# Patient Record
Sex: FEMALE | Race: WHITE | NOT HISPANIC OR LATINO | Employment: OTHER | URBAN - METROPOLITAN AREA
[De-identification: names, ages, dates, MRNs, and addresses within clinical notes are randomized per-mention and may not be internally consistent; named-entity substitution may affect disease eponyms.]

---

## 2023-03-25 ENCOUNTER — APPOINTMENT (OUTPATIENT)
Dept: MRI IMAGING | Facility: CLINIC | Age: 88
End: 2023-03-25
Attending: HOSPITALIST
Payer: MEDICARE

## 2023-03-25 ENCOUNTER — HOSPITAL ENCOUNTER (OUTPATIENT)
Facility: CLINIC | Age: 88
Setting detail: OBSERVATION
Discharge: HOME | End: 2023-03-29
Attending: EMERGENCY MEDICINE | Admitting: HOSPITALIST
Payer: MEDICARE

## 2023-03-25 ENCOUNTER — APPOINTMENT (OUTPATIENT)
Dept: CT IMAGING | Facility: CLINIC | Age: 88
End: 2023-03-25
Attending: EMERGENCY MEDICINE
Payer: MEDICARE

## 2023-03-25 DIAGNOSIS — K80.50 BILIARY COLIC: ICD-10-CM

## 2023-03-25 DIAGNOSIS — I27.20 PULMONARY HYPERTENSION (H): ICD-10-CM

## 2023-03-25 DIAGNOSIS — K44.9 HIATAL HERNIA: ICD-10-CM

## 2023-03-25 DIAGNOSIS — S22.070A COMPRESSION FRACTURE OF T9 VERTEBRA, INITIAL ENCOUNTER (H): Primary | ICD-10-CM

## 2023-03-25 DIAGNOSIS — R93.89 ABNORMAL CT OF THE CHEST: ICD-10-CM

## 2023-03-25 DIAGNOSIS — K80.20 GALLSTONES: ICD-10-CM

## 2023-03-25 DIAGNOSIS — R10.9 ABDOMINAL PAIN, UNSPECIFIED ABDOMINAL LOCATION: ICD-10-CM

## 2023-03-25 LAB
ALBUMIN SERPL BCG-MCNC: 4.4 G/DL (ref 3.5–5.2)
ALBUMIN UR-MCNC: NEGATIVE MG/DL
ALP SERPL-CCNC: 76 U/L (ref 35–104)
ALT SERPL W P-5'-P-CCNC: 23 U/L (ref 10–35)
ANION GAP SERPL CALCULATED.3IONS-SCNC: 12 MMOL/L (ref 7–15)
APPEARANCE UR: CLEAR
AST SERPL W P-5'-P-CCNC: 32 U/L (ref 10–35)
BASOPHILS # BLD AUTO: 0 10E3/UL (ref 0–0.2)
BASOPHILS NFR BLD AUTO: 1 %
BILIRUB SERPL-MCNC: 0.7 MG/DL
BILIRUB UR QL STRIP: NEGATIVE
BUN SERPL-MCNC: 8.7 MG/DL (ref 8–23)
CALCIUM SERPL-MCNC: 9.6 MG/DL (ref 8.2–9.6)
CHLORIDE SERPL-SCNC: 99 MMOL/L (ref 98–107)
COLOR UR AUTO: NORMAL
CREAT SERPL-MCNC: 0.57 MG/DL (ref 0.51–0.95)
D DIMER PPP FEU-MCNC: 0.68 UG/ML FEU (ref 0–0.5)
DEPRECATED HCO3 PLAS-SCNC: 28 MMOL/L (ref 22–29)
EOSINOPHIL # BLD AUTO: 0 10E3/UL (ref 0–0.7)
EOSINOPHIL NFR BLD AUTO: 0 %
ERYTHROCYTE [DISTWIDTH] IN BLOOD BY AUTOMATED COUNT: 13 % (ref 10–15)
GFR SERPL CREATININE-BSD FRML MDRD: 85 ML/MIN/1.73M2
GLUCOSE SERPL-MCNC: 97 MG/DL (ref 70–99)
GLUCOSE UR STRIP-MCNC: NEGATIVE MG/DL
HCT VFR BLD AUTO: 38.8 % (ref 35–47)
HGB BLD-MCNC: 12.8 G/DL (ref 11.7–15.7)
HGB UR QL STRIP: NEGATIVE
IMM GRANULOCYTES # BLD: 0 10E3/UL
IMM GRANULOCYTES NFR BLD: 1 %
KETONES UR STRIP-MCNC: NEGATIVE MG/DL
LEUKOCYTE ESTERASE UR QL STRIP: NEGATIVE
LIPASE SERPL-CCNC: 60 U/L (ref 13–60)
LYMPHOCYTES # BLD AUTO: 1.6 10E3/UL (ref 0.8–5.3)
LYMPHOCYTES NFR BLD AUTO: 26 %
MCH RBC QN AUTO: 33.1 PG (ref 26.5–33)
MCHC RBC AUTO-ENTMCNC: 33 G/DL (ref 31.5–36.5)
MCV RBC AUTO: 100 FL (ref 78–100)
MONOCYTES # BLD AUTO: 1.5 10E3/UL (ref 0–1.3)
MONOCYTES NFR BLD AUTO: 24 %
NEUTROPHILS # BLD AUTO: 3 10E3/UL (ref 1.6–8.3)
NEUTROPHILS NFR BLD AUTO: 48 %
NITRATE UR QL: NEGATIVE
NRBC # BLD AUTO: 0 10E3/UL
NRBC BLD AUTO-RTO: 0 /100
PH UR STRIP: 7 [PH] (ref 5–7)
PLAT MORPH BLD: NORMAL
PLATELET # BLD AUTO: 174 10E3/UL (ref 150–450)
POTASSIUM SERPL-SCNC: 3.9 MMOL/L (ref 3.4–5.3)
PROT SERPL-MCNC: 6.7 G/DL (ref 6.4–8.3)
RBC # BLD AUTO: 3.87 10E6/UL (ref 3.8–5.2)
RBC MORPH BLD: NORMAL
SODIUM SERPL-SCNC: 139 MMOL/L (ref 136–145)
SP GR UR STRIP: 1.01 (ref 1–1.03)
TROPONIN T SERPL HS-MCNC: 21 NG/L
UROBILINOGEN UR STRIP-MCNC: NORMAL MG/DL
WBC # BLD AUTO: 6.2 10E3/UL (ref 4–11)

## 2023-03-25 PROCEDURE — 250N000011 HC RX IP 250 OP 636: Performed by: EMERGENCY MEDICINE

## 2023-03-25 PROCEDURE — 96375 TX/PRO/DX INJ NEW DRUG ADDON: CPT

## 2023-03-25 PROCEDURE — 85025 COMPLETE CBC W/AUTO DIFF WBC: CPT | Performed by: EMERGENCY MEDICINE

## 2023-03-25 PROCEDURE — 74177 CT ABD & PELVIS W/CONTRAST: CPT | Mod: MG

## 2023-03-25 PROCEDURE — A9585 GADOBUTROL INJECTION: HCPCS | Performed by: EMERGENCY MEDICINE

## 2023-03-25 PROCEDURE — 250N000011 HC RX IP 250 OP 636: Performed by: HOSPITALIST

## 2023-03-25 PROCEDURE — G1010 CDSM STANSON: HCPCS

## 2023-03-25 PROCEDURE — C9113 INJ PANTOPRAZOLE SODIUM, VIA: HCPCS | Performed by: HOSPITALIST

## 2023-03-25 PROCEDURE — 83690 ASSAY OF LIPASE: CPT | Performed by: EMERGENCY MEDICINE

## 2023-03-25 PROCEDURE — 96374 THER/PROPH/DIAG INJ IV PUSH: CPT | Mod: XU

## 2023-03-25 PROCEDURE — 93005 ELECTROCARDIOGRAM TRACING: CPT

## 2023-03-25 PROCEDURE — 255N000002 HC RX 255 OP 636: Performed by: EMERGENCY MEDICINE

## 2023-03-25 PROCEDURE — G0378 HOSPITAL OBSERVATION PER HR: HCPCS

## 2023-03-25 PROCEDURE — 80053 COMPREHEN METABOLIC PANEL: CPT | Performed by: EMERGENCY MEDICINE

## 2023-03-25 PROCEDURE — 84484 ASSAY OF TROPONIN QUANT: CPT | Performed by: EMERGENCY MEDICINE

## 2023-03-25 PROCEDURE — 85379 FIBRIN DEGRADATION QUANT: CPT | Performed by: EMERGENCY MEDICINE

## 2023-03-25 PROCEDURE — 99285 EMERGENCY DEPT VISIT HI MDM: CPT | Mod: 25

## 2023-03-25 PROCEDURE — 72158 MRI LUMBAR SPINE W/O & W/DYE: CPT | Mod: MF

## 2023-03-25 PROCEDURE — 250N000013 HC RX MED GY IP 250 OP 250 PS 637: Performed by: HOSPITALIST

## 2023-03-25 PROCEDURE — 250N000009 HC RX 250: Performed by: EMERGENCY MEDICINE

## 2023-03-25 PROCEDURE — 36415 COLL VENOUS BLD VENIPUNCTURE: CPT | Performed by: EMERGENCY MEDICINE

## 2023-03-25 PROCEDURE — 81003 URINALYSIS AUTO W/O SCOPE: CPT | Performed by: EMERGENCY MEDICINE

## 2023-03-25 PROCEDURE — 99223 1ST HOSP IP/OBS HIGH 75: CPT | Performed by: HOSPITALIST

## 2023-03-25 PROCEDURE — 93005 ELECTROCARDIOGRAM TRACING: CPT | Mod: 76

## 2023-03-25 RX ORDER — POTASSIUM CHLORIDE 750 MG/1
10 CAPSULE, EXTENDED RELEASE ORAL 2 TIMES DAILY
COMMUNITY

## 2023-03-25 RX ORDER — LISINOPRIL 40 MG/1
40 TABLET ORAL DAILY
COMMUNITY
Start: 2022-11-23

## 2023-03-25 RX ORDER — ASPIRIN 81 MG
100 TABLET, DELAYED RELEASE (ENTERIC COATED) ORAL DAILY
Qty: 10 TABLET | Refills: 0 | Status: SHIPPED | OUTPATIENT
Start: 2023-03-25

## 2023-03-25 RX ORDER — LISINOPRIL 40 MG/1
40 TABLET ORAL DAILY
Status: DISCONTINUED | OUTPATIENT
Start: 2023-03-26 | End: 2023-03-29 | Stop reason: HOSPADM

## 2023-03-25 RX ORDER — ONDANSETRON 4 MG/1
4 TABLET, ORALLY DISINTEGRATING ORAL EVERY 6 HOURS PRN
Status: DISCONTINUED | OUTPATIENT
Start: 2023-03-25 | End: 2023-03-29 | Stop reason: HOSPADM

## 2023-03-25 RX ORDER — CITALOPRAM HYDROBROMIDE 20 MG/1
20 TABLET ORAL DAILY
COMMUNITY
Start: 2022-11-23

## 2023-03-25 RX ORDER — VITAMIN B COMPLEX
1000 TABLET ORAL
COMMUNITY
End: 2023-03-25

## 2023-03-25 RX ORDER — FAMOTIDINE 20 MG
25 TABLET ORAL
COMMUNITY

## 2023-03-25 RX ORDER — CITALOPRAM HYDROBROMIDE 20 MG/1
20 TABLET ORAL DAILY
Status: DISCONTINUED | OUTPATIENT
Start: 2023-03-26 | End: 2023-03-29 | Stop reason: HOSPADM

## 2023-03-25 RX ORDER — VIT A/VIT C/VIT E/ZINC/COPPER 2148-113
1 TABLET ORAL 2 TIMES DAILY
COMMUNITY

## 2023-03-25 RX ORDER — ACETAMINOPHEN 325 MG/1
650 TABLET ORAL EVERY 6 HOURS PRN
Status: DISCONTINUED | OUTPATIENT
Start: 2023-03-25 | End: 2023-03-26

## 2023-03-25 RX ORDER — ACETAMINOPHEN 500 MG
1000 TABLET ORAL AT BEDTIME
COMMUNITY

## 2023-03-25 RX ORDER — GABAPENTIN 100 MG/1
100 CAPSULE ORAL 2 TIMES DAILY
Status: DISCONTINUED | OUTPATIENT
Start: 2023-03-25 | End: 2023-03-29 | Stop reason: HOSPADM

## 2023-03-25 RX ORDER — GADOBUTROL 604.72 MG/ML
5 INJECTION INTRAVENOUS ONCE
Status: COMPLETED | OUTPATIENT
Start: 2023-03-25 | End: 2023-03-25

## 2023-03-25 RX ORDER — ONDANSETRON 4 MG/1
4 TABLET, ORALLY DISINTEGRATING ORAL EVERY 8 HOURS PRN
Qty: 10 TABLET | Refills: 0 | Status: SHIPPED | OUTPATIENT
Start: 2023-03-25 | End: 2023-03-28

## 2023-03-25 RX ORDER — MULTIVITAMIN WITH FOLIC ACID 400 MCG
1 TABLET ORAL EVERY EVENING
COMMUNITY

## 2023-03-25 RX ORDER — ONDANSETRON 2 MG/ML
4 INJECTION INTRAMUSCULAR; INTRAVENOUS EVERY 6 HOURS PRN
Status: DISCONTINUED | OUTPATIENT
Start: 2023-03-25 | End: 2023-03-29 | Stop reason: HOSPADM

## 2023-03-25 RX ORDER — FERROUS SULFATE 325(65) MG
325 TABLET ORAL EVERY EVENING
COMMUNITY

## 2023-03-25 RX ORDER — FENTANYL CITRATE 50 UG/ML
25 INJECTION, SOLUTION INTRAMUSCULAR; INTRAVENOUS ONCE
Status: COMPLETED | OUTPATIENT
Start: 2023-03-25 | End: 2023-03-25

## 2023-03-25 RX ORDER — HYDROCHLOROTHIAZIDE 25 MG/1
25 TABLET ORAL DAILY
COMMUNITY
Start: 2022-11-23

## 2023-03-25 RX ORDER — OXYCODONE HYDROCHLORIDE 5 MG/1
5 TABLET ORAL EVERY 6 HOURS PRN
Qty: 6 TABLET | Refills: 0 | Status: SHIPPED | OUTPATIENT
Start: 2023-03-25

## 2023-03-25 RX ORDER — ACETAMINOPHEN 500 MG
500-1000 TABLET ORAL DAILY PRN
COMMUNITY

## 2023-03-25 RX ORDER — LIDOCAINE 4 G/G
2 PATCH TOPICAL
Status: DISCONTINUED | OUTPATIENT
Start: 2023-03-25 | End: 2023-03-26

## 2023-03-25 RX ORDER — AMLODIPINE BESYLATE 10 MG/1
10 TABLET ORAL EVERY EVENING
Status: DISCONTINUED | OUTPATIENT
Start: 2023-03-25 | End: 2023-03-29 | Stop reason: HOSPADM

## 2023-03-25 RX ORDER — HYDROCHLOROTHIAZIDE 25 MG/1
25 TABLET ORAL DAILY
Status: DISCONTINUED | OUTPATIENT
Start: 2023-03-26 | End: 2023-03-29 | Stop reason: HOSPADM

## 2023-03-25 RX ORDER — AMLODIPINE BESYLATE 10 MG/1
10 TABLET ORAL EVERY EVENING
COMMUNITY
Start: 2022-11-23

## 2023-03-25 RX ORDER — IOPAMIDOL 755 MG/ML
500 INJECTION, SOLUTION INTRAVASCULAR ONCE
Status: COMPLETED | OUTPATIENT
Start: 2023-03-25 | End: 2023-03-25

## 2023-03-25 RX ORDER — CALCIUM CARBONATE 500(1250)
1 TABLET ORAL
COMMUNITY

## 2023-03-25 RX ORDER — ACETAMINOPHEN 650 MG/1
650 SUPPOSITORY RECTAL EVERY 6 HOURS PRN
Status: DISCONTINUED | OUTPATIENT
Start: 2023-03-25 | End: 2023-03-26

## 2023-03-25 RX ADMIN — FENTANYL CITRATE 25 MCG: 50 INJECTION, SOLUTION INTRAMUSCULAR; INTRAVENOUS at 15:04

## 2023-03-25 RX ADMIN — PANTOPRAZOLE SODIUM 40 MG: 40 INJECTION, POWDER, FOR SOLUTION INTRAVENOUS at 22:40

## 2023-03-25 RX ADMIN — SODIUM CHLORIDE 72 ML: 9 INJECTION, SOLUTION INTRAVENOUS at 16:15

## 2023-03-25 RX ADMIN — FAMOTIDINE 20 MG: 10 INJECTION, SOLUTION INTRAVENOUS at 15:09

## 2023-03-25 RX ADMIN — AMLODIPINE BESYLATE 10 MG: 10 TABLET ORAL at 22:39

## 2023-03-25 RX ADMIN — LIDOCAINE PATCH 4% 2 PATCH: 40 PATCH TOPICAL at 22:40

## 2023-03-25 RX ADMIN — GADOBUTROL 5 ML: 604.72 INJECTION INTRAVENOUS at 20:28

## 2023-03-25 RX ADMIN — IOPAMIDOL 51 ML: 755 INJECTION, SOLUTION INTRAVENOUS at 16:15

## 2023-03-25 RX ADMIN — GABAPENTIN 100 MG: 100 CAPSULE ORAL at 22:39

## 2023-03-25 RX ADMIN — ACETAMINOPHEN 650 MG: 325 TABLET, FILM COATED ORAL at 22:39

## 2023-03-25 ASSESSMENT — ACTIVITIES OF DAILY LIVING (ADL)
ADLS_ACUITY_SCORE: 35
ADLS_ACUITY_SCORE: 37
ADLS_ACUITY_SCORE: 35

## 2023-03-25 ASSESSMENT — ENCOUNTER SYMPTOMS
VOMITING: 0
NAUSEA: 0
SHORTNESS OF BREATH: 1
BACK PAIN: 1
NUMBNESS: 0
DIARRHEA: 0

## 2023-03-25 NOTE — PHARMACY-ADMISSION MEDICATION HISTORY
Admission medication history interview status for this patient is complete. See [facility] admission navigator for allergy information, prior to admission medications and immunization status.     Medication history interview done, indicate source(s): Patient and Family  Medication history resources (including written lists, pill bottles, clinic record): [employer]  Pharmacy: -    Changes made to PTA medication list:  Added: All    Actions taken by pharmacist (provider contacted, etc):None     Additional medication history information:None    Medication reconciliation/reorder completed by provider prior to medication history?  N   (Y/N)          Prior to Admission medications    Medication Sig Last Dose Taking? Auth Provider Long Term End Date   acetaminophen (TYLENOL) 500 MG tablet Take 1,000 mg by mouth At Bedtime 3/24/2023 at HS Yes Unknown, Entered By History     acetaminophen (TYLENOL) 500 MG tablet Take 500-1,000 mg by mouth daily as needed for mild pain 3/25/2023 at AM- 1000 mg Yes Unknown, Entered By History     amLODIPine (NORVASC) 10 MG tablet Take 10 mg by mouth every evening 3/24/2023 at PM Yes Unknown, Entered By History Yes    calcium carbonate (OS-PAYTON) 500 MG tablet Take 1 tablet by mouth daily at 2 pm 3/24/2023 at 1400 Yes Unknown, Entered By History     citalopram (CELEXA) 20 MG tablet Take 20 mg by mouth daily 3/24/2023 at AM Yes Unknown, Entered By History Yes    ferrous sulfate (FEROSUL) 325 (65 Fe) MG tablet Take 325 mg by mouth every evening 3/24/2023 at PM Yes Unknown, Entered By History     hydrochlorothiazide (HYDRODIURIL) 25 MG tablet Take 25 mg by mouth daily 3/24/2023 at AM Yes Unknown, Entered By History Yes    lisinopril (ZESTRIL) 40 MG tablet Take 40 mg by mouth daily 3/24/2023 at AM Yes Unknown, Entered By History Yes    Multiple Vitamin (TAB-A-GUILHERME) TABS Take 1 tablet by mouth every evening 3/24/2023 at PM Yes Unknown, Entered By History     Multiple Vitamins-Minerals (PRESERVISION AREDS)  TABS Take 1 tablet by mouth 2 times daily 3/24/2023 at PM Yes Unknown, Entered By History     omeprazole (PRILOSEC) 20 MG DR capsule Take 20 mg by mouth 2 times daily May take a third capsule if needed 3/25/2023 at AM Yes Unknown, Entered By History     potassium chloride ER (MICRO-K) 10 MEQ CR capsule Take 10 mEq by mouth 2 times daily 3/24/2023 at PM Yes Unknown, Entered By History     Vitamin D, Cholecalciferol, 25 MCG (1000 UT) CAPS Take 25 mcg by mouth daily at 2 pm 3/24/2023 at 1400 Yes Unknown, Entered By History

## 2023-03-25 NOTE — ED TRIAGE NOTES
"Pt denies injury, c/o pain in thoracic back last night, was able to find a comfortable position and slept well last night. This morning pt went to get out of bed and when she stood she states \"urine gushed out\". Denies pain with voiding. Pt also c/o pain bilateral chest wall, the pain wraps around her back. Pain is constant, intermittently sharp. Pt denies cough.      Triage Assessment     Row Name 03/25/23 1257       Triage Assessment (Adult)    Airway WDL WDL       Respiratory WDL    Respiratory WDL WDL       Skin Circulation/Temperature WDL    Skin Circulation/Temperature WDL WDL       Cardiac WDL    Cardiac WDL --  c/o chest pain today       Peripheral/Neurovascular WDL    Peripheral Neurovascular WDL WDL       Cognitive/Neuro/Behavioral WDL    Cognitive/Neuro/Behavioral WDL WDL              "

## 2023-03-25 NOTE — ED NOTES
Cardiac (Adult) Cardiac WDL:  (pt comes in with chest pain and upper back pain that is constant and started yesterday evening. pt denies any SOB.)

## 2023-03-25 NOTE — DISCHARGE INSTRUCTIONS
UROLOGY    If urinary incontinence returns, please contact us for urodynamics and further evaluation.  [phone]

## 2023-03-25 NOTE — ED NOTES
"[facility]  ED Nurse Handoff Report    Jackie Gauthier is a 89 year old female   ED Chief complaint: Chest Pain  . ED Diagnosis:   Final diagnoses:   Gallstones   Biliary colic   Hiatal hernia   Pulmonary hypertension (H)     Allergies:   Allergies   Allergen Reactions     Codeine Unknown and Other (See Comments)     \"Gets loopy\"  \"Feels sick and goofy\"       Morphine Dizziness       Code Status: Full Code  Activity level - Baseline/Home:  Independent. Activity Level - Current:   Assist X 2. Lift room needed: No. Bariatric: No   Needed: No   Isolation: No. Infection: Not Applicable.     Vital Signs:   Vitals:    03/25/23 1530 03/25/23 1531 03/25/23 1546 03/25/23 1600   BP: (!) 146/89  (!) 143/87 (!) 157/86   Pulse: 80 75 81 80   Resp: 21 (!) 32 12 13   Temp:       TempSrc:       SpO2: 94% 96% 95% 96%   Weight:       Height:         Cardiac Rhythm:  ,      Undetermined rhythm   Left axis deviation   Nonspecific T wave abnormality   Abnormal ECG  Pain level:    Patient confused: No. Patient Falls Risk: Yes.   Elimination Status: Has voided   Patient Report / Focused Assessment:    Cardiac (Adult) Cardiac WDL:  (pt comes in with chest pain and upper back pain that is constant and started yesterday evening. pt denies any SOB.)   Tests Performed / Abnormal Results:   CT Chest (PE) Abdomen Pelvis w Contrast   Final Result   IMPRESSION:   1.  No pulmonary embolism. Dilated central pulmonary arteries suggesting pulmonary hypertension.   2.  Small bilateral pleural effusions. Right pleural effusion appears loculated.   3.  1.7 cm masslike area of airspace changes within the right middle lobe. This has an appearance suggestive of rounded atelectasis. However recommend follow-up chest CT to ascertain resolution.   4.   Nonspecific mild mediastinal and right hilar adenopathy.   5.  Large hiatal hernia.   6.  Cholelithiasis      MR Thoracic Spine w/o & w Contrast    (Results Pending)   MR Lumbar Spine " w/o & w Contrast    (Results Pending)     Labs Ordered and Resulted from Time of ED Arrival to Time of ED Departure   TROPONIN T, HIGH SENSITIVITY - Abnormal       Result Value    Troponin T, High Sensitivity 21 (*)    D DIMER QUANTITATIVE - Abnormal    D-Dimer Quantitative 0.68 (*)    CBC WITH PLATELETS AND DIFFERENTIAL - Abnormal    WBC Count 6.2      RBC Count 3.87      Hemoglobin 12.8      Hematocrit 38.8            MCH 33.1 (*)     MCHC 33.0      RDW 13.0      Platelet Count 174      % Neutrophils 48      % Lymphocytes 26      % Monocytes 24      % Eosinophils 0      % Basophils 1      % Immature Granulocytes 1      NRBCs per 100 WBC 0      Absolute Neutrophils 3.0      Absolute Lymphocytes 1.6      Absolute Monocytes 1.5 (*)     Absolute Eosinophils 0.0      Absolute Basophils 0.0      Absolute Immature Granulocytes 0.0      Absolute NRBCs 0.0     COMPREHENSIVE METABOLIC PANEL - Normal    Sodium 139      Potassium 3.9      Chloride 99      Carbon Dioxide (CO2) 28      Anion Gap 12      Urea Nitrogen 8.7      Creatinine 0.57      Calcium 9.6      Glucose 97      Alkaline Phosphatase 76      AST 32      ALT 23      Protein Total 6.7      Albumin 4.4      Bilirubin Total 0.7      GFR Estimate 85     LIPASE - Normal    Lipase 60     URINE MACROSCOPIC WITH REFLEX TO MICRO - Normal    Color Urine Light Yellow      Appearance Urine Clear      Glucose Urine Negative      Bilirubin Urine Negative      Ketones Urine Negative      Specific Gravity Urine 1.009      Blood Urine Negative      pH Urine 7.0      Protein Albumin Urine Negative      Urobilinogen Urine Normal      Nitrite Urine Negative      Leukocyte Esterase Urine Negative     RBC AND PLATELET MORPHOLOGY    Platelet Assessment        Value: Automated Count Confirmed. Platelet morphology is normal.    RBC Morphology Confirmed RBC Indices     Treatments provided: PIV, LABS, MRI, PUREWICK, CTSCAN  Family Comments: at bedside  OBS brochure/video  discussed/provided to patient:  Yes  ED Medications:   Medications   famotidine (PEPCID) injection 20 mg (20 mg Intravenous $Given 3/25/23 1503)   fentaNYL (PF) (SUBLIMAZE) injection 25 mcg (25 mcg Intravenous $Given 3/25/23 1504)   iopamidol (ISOVUE-370) solution 500 mL (51 mLs Intravenous $Given 3/25/23 1615)   for CT scan flush use (72 mLs Intravenous $Given 3/25/23 2495)     Drips infusing:  No  For the majority of the shift, the patient's behavior Green. Interventions performed were n0ne.    Sepsis treatment initiated: No     Patient tested for COVID 19 prior to admission: NO    ED Nurse Name/Phone Number: Tiffanie Aguirre RN,   6:28 PM      RECEIVING UNIT ED HANDOFF REVIEW    Above ED Nurse Handoff Report was reviewed: Yes  Reviewed by: Jose G Oliver RN on March 25, 2023 at 8:03 PM

## 2023-03-25 NOTE — ED PROVIDER NOTES
"  History     Chief Complaint:  Chest and back pain     HPI   Jackie Gauthier is a 89 year old female with a history of stroke, hypertension, and hyperparathyroidism who presents with chest pain and back pain. Patient reports having a history of chronic back pain, but reports her pain today is different stating it going from a 8 to a 10/10. She notes its more of a bad \"achiness\" then pain. She reports the onset of her pain started 2 days ago and travels from her back to the front of her chest. She notes it to start below her shoulder and ends at her waist. Patient reports stretching and bending her back relieves her pain, but sitting down worsens and \"brings the pain back\". She reports bladder incontinence late last night and this morning. She reports having shortness of breath with her radiating pain. Denies fever, cough, current chest pain, nausea, vomiting, diarrhea, dysuria, saddle paresthesias, focal weakness. No reported trauma.     Independent Historian:   Patient     Review of External Notes: I reviewed the note 01/09/23    ROS:  Review of Systems   Respiratory: Positive for shortness of breath.    Cardiovascular: Positive for chest pain.   Gastrointestinal: Negative for diarrhea, nausea and vomiting.   Musculoskeletal: Positive for back pain.   Neurological: Negative for numbness.   All other systems reviewed and are negative.    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     Physical Exam     Patient Vitals for the past 24 hrs:   BP Temp Temp src Pulse Resp SpO2 Height Weight   03/25/23 1600 (!) 157/86 -- -- 80 13 96 % -- --   03/25/23 1530 (!) 146/89 -- -- 80 21 94 % -- --   03/25/23 1500 (!) 165/84 -- -- 82 20 98 % -- --   03/25/23 1303 (!) 173/83 99.1  F (37.3  C) Temporal 89 24 97 % 1.473 m (4' 10\") 51.3 kg (113 lb)        Physical Exam  Nursing note and vitals reviewed.  Constitutional: Well nourished. Resting comfortably.   Eyes: Conjunctiva normal.  Pupils are equal, round, and reactive to light.   ENT: Nose normal. Mucous membranes pink and moist.    Neck: Normal range of motion.  CVS: Normal rate, regular rhythm.  Normal heart sounds.    Pulmonary: Lungs clear to auscultation bilaterally. No wheezes/rales/rhonchi.  GI: Abdomen soft. Mild generalized tenderness though greatest RUQ/epigastric region. No rigidity or guarding.  Mild R. CVA tenderness.   MSK: No calf tenderness or swelling. No c/t/l bony tenderness.   There is normal motor strength:     Iliopsoas, quadriceps, hamstrings, tibialis anterior, gastrocnemius, EHL    Sensation intact L2-S1 on bilateral lower extremities    Patellar reflexes are normal  Neuro: Alert. Follows simple commands.  Skin: Skin is warm and dry. No rash noted.   Psychiatric: Normal affect.         Emergency Department Course   ECG #1  ECG taken at 1311, ECG read at 1311  Undetermined rhythm  Left axis deviation  Nonspecific T wave abnormality  Abnormal ECG   Rate 94 bpm. AZ interval 326 ms. QRS duration 92 ms. QT/QTc 417/517 ms. P-R-T axes * -30 270.     ECG #2  ECG taken at 1933, ECG read at 1933  Atrial fibrillation with premature " ventricular or aberrantly conducted complexes  Low voltage QRS  Nonspecific ST and T wave abnormality   Abnormal ECG   Rate 81 bpm. AK interval * ms. QRS duration 86 ms. QT/QTc 406/471 ms. P-R-T axes * -23 -69.     Imaging:  CT Chest (PE) Abdomen Pelvis w Contrast   Final Result   IMPRESSION:   1.  No pulmonary embolism. Dilated central pulmonary arteries suggesting pulmonary hypertension.   2.  Small bilateral pleural effusions. Right pleural effusion appears loculated.   3.  1.7 cm masslike area of airspace changes within the right middle lobe. This has an appearance suggestive of rounded atelectasis. However recommend follow-up chest CT to ascertain resolution.   4.   Nonspecific mild mediastinal and right hilar adenopathy.   5.  Large hiatal hernia.   6.  Cholelithiasis      MR Thoracic Spine w/o & w Contrast    (Results Pending)   MR Lumbar Spine w/o & w Contrast    (Results Pending)      Report per radiology    Laboratory:  Labs Ordered and Resulted from Time of ED Arrival to Time of ED Departure   TROPONIN T, HIGH SENSITIVITY - Abnormal       Result Value    Troponin T, High Sensitivity 21 (*)    D DIMER QUANTITATIVE - Abnormal    D-Dimer Quantitative 0.68 (*)    CBC WITH PLATELETS AND DIFFERENTIAL - Abnormal    WBC Count 6.2      RBC Count 3.87      Hemoglobin 12.8      Hematocrit 38.8            MCH 33.1 (*)     MCHC 33.0      RDW 13.0      Platelet Count 174      % Neutrophils 48      % Lymphocytes 26      % Monocytes 24      % Eosinophils 0      % Basophils 1      % Immature Granulocytes 1      NRBCs per 100 WBC 0      Absolute Neutrophils 3.0      Absolute Lymphocytes 1.6      Absolute Monocytes 1.5 (*)     Absolute Eosinophils 0.0      Absolute Basophils 0.0      Absolute Immature Granulocytes 0.0      Absolute NRBCs 0.0     COMPREHENSIVE METABOLIC PANEL - Normal    Sodium 139      Potassium 3.9      Chloride 99      Carbon Dioxide (CO2) 28      Anion Gap 12      Urea Nitrogen 8.7      Creatinine  0.57      Calcium 9.6      Glucose 97      Alkaline Phosphatase 76      AST 32      ALT 23      Protein Total 6.7      Albumin 4.4      Bilirubin Total 0.7      GFR Estimate 85     LIPASE - Normal    Lipase 60     URINE MACROSCOPIC WITH REFLEX TO MICRO - Normal    Color Urine Light Yellow      Appearance Urine Clear      Glucose Urine Negative      Bilirubin Urine Negative      Ketones Urine Negative      Specific Gravity Urine 1.009      Blood Urine Negative      pH Urine 7.0      Protein Albumin Urine Negative      Urobilinogen Urine Normal      Nitrite Urine Negative      Leukocyte Esterase Urine Negative     RBC AND PLATELET MORPHOLOGY    Platelet Assessment        Value: Automated Count Confirmed. Platelet morphology is normal.    RBC Morphology Confirmed RBC Indices     TROPONIN T, HIGH SENSITIVITY        Emergency Department Course & Assessments:    Interventions:  Medications   famotidine (PEPCID) injection 20 mg (20 mg Intravenous $Given 3/25/23 1509)   fentaNYL (PF) (SUBLIMAZE) injection 25 mcg (25 mcg Intravenous $Given 3/25/23 1504)   iopamidol (ISOVUE-370) solution 500 mL (51 mLs Intravenous $Given 3/25/23 1615)   for CT scan flush use (72 mLs Intravenous $Given 3/25/23 1615)      Assessments/Consultations/Discussion of Management or Tests:   ED Course as of 03/25/23 1944   Sat Mar 25, 2023   1420 I obtained history and examined the patient as noted above.     1559 I rechecked the patient and explained findings.    1750 I spoke with Randy Mckeon MD, Hospitalist who agreed to admit the patient.         Disposition:  The patient was admitted to the hospital under the care of Randy Mckeon MD.    Impression & Plan    Medical Decision Making:  Patient is a 89-year-old female presenting with a myriad of complaints predominately chest pain and back pain.  She is nontoxic appearing on arrival, in no significant distress.  She has no reproducible bony tenderness on my exam and I doubt spinal cord  pathology.  Her tenderness was mostly abdominal and she did have some right CVA tenderness as well.  EKG without STEMI.  High-sensitivity screening troponin mildly elevated though repeat stable.  I have a low clinical suspicion for ACS.  CT without evidence of PE though does suggest dilated central pulmonary arteries suggestive of pulmonary hypertension.  Small bilateral pleural effusions noted as well.  She does have nonspecific mediastinal and right hilar adenopathy.  Incidental hiatal hernia identified in addition to gallstones though no evidence of cholecystitis and no evidence of intra-abdominal catastrophe.  I have a strong suspicion that her presentation may be secondary to biliary colic.  Labs without profound anemia or significant electrolyte derangements.  LFTs stable.  Patient reported significant symptom improvement during her time in the ED.  On my exam again she had no reproducible back pain though on discussing the etiology of her reported incontinence, I did discuss attempting a postvoid residual though son expresses concerns for patient's ability to care for herself.  He is requesting admission at this point in time.  We did discuss pending hospitalist evaluation she may benefit from formal MRIs to exclude potential underlying lumbar pathology.      Diagnosis:    ICD-10-CM    1. Gallstones  K80.20       2. Biliary colic  K80.50       3. Hiatal hernia  K44.9       4. Pulmonary hypertension (H)  I27.20       5. Abdominal pain, unspecified abdominal location  R10.9       6. Abnormal CT of the chest  R93.89     adenopathy           Discharge Medications:  New Prescriptions    DOCUSATE SODIUM (COLACE) 100 MG TABLET    Take 1 tablet (100 mg) by mouth daily    ONDANSETRON (ZOFRAN ODT) 4 MG ODT TAB    Take 1 tablet (4 mg) by mouth every 8 hours as needed for nausea    OXYCODONE (ROXICODONE) 5 MG TABLET    Take 1 tablet (5 mg) by mouth every 6 hours as needed for severe pain (7-10)        Scribe  Disclosure:  I, CASI PETE, am serving as a scribe at 5:21 PM on 3/25/2023 to document services personally performed by Arabella Dorsey DO based on my observations and the provider's statements to me.     3/25/2023   Arabella Dorsey DO McDonald, Lindsey E,   03/25/23 1959

## 2023-03-25 NOTE — H&P
[facility]    History and Physical  Hospitalist       Date of Admission:  3/25/2023    Assessment & Plan   Jackie Gauthier is a 89 year old female with a past medical history of chronic low back pain with lumbar fusion decades ago, hypertension, hiatal hernia on PPI who presents with worsening back pain, epigastric abdominal pain and urinary incontinence.    #Progressive mid to low back pain with urinary incontinence: Patient has a longstanding history of chronic low back pain.  She had a lumbar fusion done decades ago.  She notes that a couple of days ago she had onset of worsening mid to low back pain.  She has been able to ambulate per usual.  She feels like the pain is similar to prior fracture she has had.  She denies any trauma.  No falls.  She has full sensation and strength of her lower extremities.  Last night, she got up to go to the bathroom from bed and had complete loss of her bladder function.  She has never had any bladder incontinence in the past.  -ED, patient afebrile and nontachycardic.  Hypertensive with systolic blood pressure in the 140-170 range.  Her CBC without leukocytosis.  BMP unremarkable.  Her high-sensitivity troponin was mildly elevated at 21.  EKG with sinus rhythm and not clear ischemic changes.  Her D-dimer was positive.  UA without indication of infection.  She underwent a CT PE abdomen pelvis that showed no PE but evidence of pulmonary hypertension with rounded atelectasis with large hiatal hernia and cholelithiasis.  -Does have full strength of her lower extremities bilaterally.  Sensation is intact.  Not the clearest picture for any sort of cauda equina syndrome given this but with sudden urinary incontinence I do believe an MRI is warranted.  -MRI of the thoracic and lumbar spine ordered.  Discussed with ER physician will be done in the ER prior to being sent to observation.  -Lidocaine patch, as needed Tylenol.  We will also trial low-dose  gabapentin.  -If MRI is negative may need urology follow up.     #Epigastric abdominal discomfort suspect secondary to large hiatal hernia with possible cholelithiasis: Patient also notes epigastric abdominal discomfort.  She notes that this is somewhat more chronic.  She tries to eat small meals and believes it is related to her hiatal hernia.  Bowel movements have been normal.  She has had poor p.o. intake.  No nausea vomiting.  She denies any fevers or chills.  No chest pain.  No shortness of breath.  -This seems to be a bit more of a chronic issue for her.  I do suspect this is largely due to her hiatal hernia noted on CT scan.  She does have cholelithiasis noted but she denies any worsening of pain with eating/drinking.  -IV PPI while here in the hospital.  Encourage small meals.  Would not eat or drink just before bed.  Keep the head of the bed elevated to 30 degrees which should also help.  -Repeat labs in the a.m.  Consider general surgery eval as an outpatient for cholelithiasis.    #Mildly elevated troponin: EKG seems to show sinus rhythm with PACs noted without clear ischemic changes.  No clear chest pain.  Very mild elevation.  I am going to repeat an EKG.  We will check another troponin.  If no significant uptrend would not work-up further.    #HTN: We will await pharmacy med rec and resume home meds once verified.    DVT Prophylaxis: Pneumatic Compression Devices  Code Status: DNR / DNI.  Discussed on admission.  Dispo: [place] to observation    Randy Bae MD    Primary Care Physician   Nya Sands    Chief Complaint   Worsening mid-low back pain    History is obtained from the patient, patient's son, patient's chart and discussed with ER physician    History of Present Illness   Jackie Gauthier is a 89 year old female with a past medical history of chronic low back pain with lumbar fusion decades ago, hypertension, hiatal hernia on PPI who presents with worsening back pain, epigastric  abdominal pain and urinary incontinence.    Patient has a longstanding history of chronic low back pain.  She had a lumbar fusion done decades ago.  She notes that a couple of days ago she had onset of worsening mid to low back pain.  She has been able to ambulate per usual.  She feels like the pain is similar to prior fracture she has had.  She denies any trauma.  No falls.  She has full sensation and strength of her lower extremities.  Last night, she got up to go to the bathroom from bed and had complete loss of her bladder function.  She has never had any bladder incontinence in the past.    Patient also notes epigastric abdominal discomfort.  She notes that this is somewhat more chronic.  She tries to eat small meals and believes it is related to her hiatal hernia.  Bowel movements have been normal.  She has had poor p.o. intake.  No nausea vomiting.  She denies any fevers or chills.  No chest pain.  No shortness of breath.    In the ED, patient afebrile and nontachycardic.  Hypertensive with systolic blood pressure in the 140-170 range.  Her CBC without leukocytosis.  BMP unremarkable.  Her high-sensitivity troponin was mildly elevated at 21.  EKG with sinus rhythm and not clear ischemic changes.  Her D-dimer was positive.  She underwent a CT PE abdomen pelvis that showed no PE but evidence of pulmonary hypertension with rounded atelectasis with large hiatal hernia and cholelithiasis.    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 Javed     CATARACT REMOVAL 2/12/15 Right Dr. Javed       Prior to Admission Medications   None     bevacizumab 25mg/mL intravitreal solution(TONY AMB MIXTURE)   Use as directed for procedure. Refrigerate. 0.1 mL     01/06/2015   Active   acetaminophen (TYLENOL) 325 mg tablet   Take 325-650 mg by mouth every 6 hours if needed. Max acetaminophen dose: 4000mg in 24 hrs.   0     Active   ALPRAZolam (XANAX) 0.25 mg tablet   Take 0.25 mg by mouth at bedtime if needed.   0     Active   amLODIPine (NORVASC) 10 mg tablet   Take 10 mg by mouth once daily.   0     Active   ASCORBATE CALCIUM (VITAMIN C ORAL)   Take by mouth.   0     Active   CALCIUM CARBONATE/VITAMIN D3 (CALCIUM + D ORAL)   Take by mouth.   0     Active   cholecalciferol (VITAMIN D3) 1,000 unit tablet   Take 1,000 Units by mouth once daily.   0     Active   citalopram (CELEXA) 20 mg tablet   Take 20 mg by mouth once daily.   0     Active   denosumab (PROLIA) 60 mg/mL injection   Inject 60 mg subcutaneous q 24 weeks.   0     Active   ferrous sulfate, 65 mg elemental, (IRON) 325 mg (65 mg iron) tablet   Take 325 mg by mouth once daily with a meal.   0     Active   hydrochlorothiazide (HCTZ) 25 mg tablet   Take 25 mg by mouth once daily.   0     Active   ketorolac 0.5 % ophthalmic (ACULAR) solution   Place 1 Drop into left eye 2 times daily. Start 3 days before surgery and once the day of surgey   0     Active   lisinopril (PRINIVIL; ZESTRIL) 40 mg tablet   Take 40 mg by mouth once daily.   0     Active   multivitamin (MVI) tablet   Take 1 tablet by  "mouth once daily.   0     Active   VIT C/VIT E/LUTEIN/MIN/OMEGA-3 (OCUVITE ORAL)   Take by mouth.   0     Active   neomycin-polymyxin-dexamethasone (MAXITROL) 3.5mg/mL-10,000 unit/mL-0.1 % ophthalmic suspension   1 Drop 4 times daily.   0     Active   omeprazole (PRILOSEC) 20 mg Delayed-Release capsule   Take 20 mg by mouth 2 times daily before meals.   0     Active   potassium chloride (POTASSIUM CHLORIDE) 10 mEq tablet   Take 10 mEq by mouth 2 times daily with meals.          Allergies   Allergies   Allergen Reactions     Codeine Unknown and Other (See Comments)     \"Gets loopy\"  \"Feels sick and goofy\"       Morphine Dizziness       Social History   Lives by herself.  Uses a cane or walker for ambulation.  Non-smoker    Family History   Reviewed-noncontributory    Review of Systems   The 10 point Review of Systems is negative other than noted in the HPI or here.     Physical Exam   Temp: 99.1  F (37.3  C) Temp src: Temporal BP: (!) 157/86 Pulse: 80   Resp: 13 SpO2: 96 %      Vital Signs with Ranges  Temp:  [99.1  F (37.3  C)] 99.1  F (37.3  C)  Pulse:  [80-89] 80  Resp:  [13-24] 13  BP: (146-173)/(83-89) 157/86  SpO2:  [94 %-98 %] 96 %  113 lbs 0 oz    Constitutional: Elderly female, no acute distress.  Answers appropriately.  HEENT: Normocephalic, MMM, no elevation of JVD noted  Respiratory: Nl WOB, Clear bilaterally, No wheezes, no crackles  Cardiovascular: Regular, systolic murmur noted  GI: Bowel sounds present.  She does have some tenderness in the epigastric region but no rebound or guarding.  Lymph/Hematologic: No bruising. No cervical LAD  Skin: No rash  Musculoskeletal: Patient does have some tenderness in the paraspinal regions of thoracic and lumbar spine.  No point tenderness along the spinous processes however.  Patient has full strength of her lower extremities bilaterally.  Sensation is intact to touch in the distal lower extremities.  Neurologic: A&Ox3, Answers appropriately. CNII-XII intact. Moves " all extremities. No tremor  Psychiatric: Calm    Data   Data reviewed today:  I personally reviewed  Recent Labs   Lab 03/25/23  1434   WBC 6.2   HGB 12.8            POTASSIUM 3.9   CHLORIDE 99   CO2 28   BUN 8.7   CR 0.57   ANIONGAP 12   PAYTON 9.6   GLC 97   ALBUMIN 4.4   PROTTOTAL 6.7   BILITOTAL 0.7   ALKPHOS 76   ALT 23   AST 32   LIPASE 60       Recent Results (from the past 24 hour(s))   CT Chest (PE) Abdomen Pelvis w Contrast    Narrative    EXAM: CT CHEST PE ABDOMEN PELVIS W CONTRAST  LOCATION: [facility]  DATE/TIME: 3/25/2023 4:23 PM    INDICATION: Chest and abdominal pain.  COMPARISON: CT abdomen and pelvis 11/02/2009  TECHNIQUE: CT chest pulmonary angiogram and routine CT abdomen pelvis with IV contrast. Arterial phase through the chest and venous phase through the abdomen and pelvis. Multiplanar reformats and MIP reconstructions were performed. Dose reduction   techniques were used.   CONTRAST: 51mL Isovue 370    FINDINGS:  ANGIOGRAM CHEST: Mildly dilated central pulmonary arteries suggesting pulmonary hypertension. Heterogeneous opacification of several subsegmental branches within the bilateral lower lobes secondary to turbulent flow. No pulmonary emboli are visualized.   Calcified atherosclerotic changes of the thoracic aorta. Per protocol there is no luminal enhancement. No thoracic aortic aneurysm.      LUNGS AND PLEURA: Small bilateral pleural effusions. The right pleural effusion appears loculated. Mild bilateral apical fibrotic changes. Mild, smooth interlobular septal thickening within the bilateral lower upper lobes. This is a nonspecific finding   but may be seen with early interstitial pulmonary edema. Approximately 1.7 x 0.8 cm masslike area of peripheral airspace opacity within the anterior aspect of the right middle lobe. This has dendritic extensions which suggested this may represent rounded   atelectasis.    MEDIASTINUM/AXILLAE: Mildly enlarged  hilar and right paratracheal lymph nodes. Large hiatal hernia protrusion of the majority of the stomach into the mediastinum. Marked cardiomegaly.    CORONARY ARTERY CALCIFICATION: Moderate.    HEPATOBILIARY: 2.6 cm lobulated benign cyst arising from the superior aspect of the left hepatic lobe. 2.3 cm gallstone.    PANCREAS: Normal.    SPLEEN: Normal.    ADRENAL GLANDS: Stable 2.9 cm right adrenal nodule representing a benign adenoma. Negative left adrenal.    KIDNEYS/BLADDER: Multiple benign-appearing bilateral renal cysts. The dominant cyst, which arising from the lateral aspect of the right kidney measures 5.5 x 5.5 cm in diameter. These have a benign appearance and no specific follow-up is necessary. No   hydronephrosis.    BOWEL: Normal caliber loops of small bowel. Duodenal diverticulum. Normal caliber colon. Diverticulosis of the descending and sigmoid portions of the colon. No CT evidence for diverticulitis.    LYMPH NODES: Normal.    VASCULATURE: Moderate calcified atherosclerotic changes. No abdominal aortic aneurysm.    PELVIC ORGANS: Coarse calcifications within a mildly prominent uterus likely within uterine fibroids.    MUSCULOSKELETAL: Chronic fractures of the posterior and posterolateral access of the right ninth and 10th ribs. Degenerative changes visualized spine with increased thoracic kyphosis. Degenerative changes bilateral hips. Partially visualized   postoperative changes of a right femoral intramedullary nail with dynamic hip screw fixation. Degenerative changes bilateral shoulders.      Impression    IMPRESSION:  1.  No pulmonary embolism. Dilated central pulmonary arteries suggesting pulmonary hypertension.  2.  Small bilateral pleural effusions. Right pleural effusion appears loculated.  3.  1.7 cm masslike area of airspace changes within the right middle lobe. This has an appearance suggestive of rounded atelectasis. However recommend follow-up chest CT to ascertain resolution.  4.    Nonspecific mild mediastinal and right hilar adenopathy.  5.  Large hiatal hernia.  6.  Cholelithiasis       Clinically Significant Risk Factors Present on Admission

## 2023-03-26 LAB
ALBUMIN SERPL BCG-MCNC: 3.9 G/DL (ref 3.5–5.2)
ALP SERPL-CCNC: 69 U/L (ref 35–104)
ALT SERPL W P-5'-P-CCNC: 18 U/L (ref 10–35)
ANION GAP SERPL CALCULATED.3IONS-SCNC: 9 MMOL/L (ref 7–15)
AST SERPL W P-5'-P-CCNC: 26 U/L (ref 10–35)
BILIRUB SERPL-MCNC: 0.7 MG/DL
BUN SERPL-MCNC: 9.1 MG/DL (ref 8–23)
CALCIUM SERPL-MCNC: 9.1 MG/DL (ref 8.2–9.6)
CHLORIDE SERPL-SCNC: 102 MMOL/L (ref 98–107)
CREAT SERPL-MCNC: 0.58 MG/DL (ref 0.51–0.95)
DEPRECATED HCO3 PLAS-SCNC: 29 MMOL/L (ref 22–29)
ERYTHROCYTE [DISTWIDTH] IN BLOOD BY AUTOMATED COUNT: 12.9 % (ref 10–15)
GFR SERPL CREATININE-BSD FRML MDRD: 84 ML/MIN/1.73M2
GLUCOSE SERPL-MCNC: 86 MG/DL (ref 70–99)
HCT VFR BLD AUTO: 37 % (ref 35–47)
HGB BLD-MCNC: 12.2 G/DL (ref 11.7–15.7)
MCH RBC QN AUTO: 33 PG (ref 26.5–33)
MCHC RBC AUTO-ENTMCNC: 33 G/DL (ref 31.5–36.5)
MCV RBC AUTO: 100 FL (ref 78–100)
PLATELET # BLD AUTO: 162 10E3/UL (ref 150–450)
POTASSIUM SERPL-SCNC: 3.4 MMOL/L (ref 3.4–5.3)
PROT SERPL-MCNC: 5.8 G/DL (ref 6.4–8.3)
RBC # BLD AUTO: 3.7 10E6/UL (ref 3.8–5.2)
SODIUM SERPL-SCNC: 140 MMOL/L (ref 136–145)
TROPONIN T SERPL HS-MCNC: 22 NG/L
WBC # BLD AUTO: 6.7 10E3/UL (ref 4–11)

## 2023-03-26 PROCEDURE — G0378 HOSPITAL OBSERVATION PER HR: HCPCS

## 2023-03-26 PROCEDURE — C9113 INJ PANTOPRAZOLE SODIUM, VIA: HCPCS | Performed by: HOSPITALIST

## 2023-03-26 PROCEDURE — 85027 COMPLETE CBC AUTOMATED: CPT | Performed by: HOSPITALIST

## 2023-03-26 PROCEDURE — 250N000011 HC RX IP 250 OP 636: Performed by: HOSPITALIST

## 2023-03-26 PROCEDURE — 36415 COLL VENOUS BLD VENIPUNCTURE: CPT | Performed by: HOSPITALIST

## 2023-03-26 PROCEDURE — 84484 ASSAY OF TROPONIN QUANT: CPT | Performed by: INTERNAL MEDICINE

## 2023-03-26 PROCEDURE — 250N000013 HC RX MED GY IP 250 OP 250 PS 637: Performed by: INTERNAL MEDICINE

## 2023-03-26 PROCEDURE — 80053 COMPREHEN METABOLIC PANEL: CPT | Performed by: HOSPITALIST

## 2023-03-26 PROCEDURE — 99233 SBSQ HOSP IP/OBS HIGH 50: CPT | Performed by: INTERNAL MEDICINE

## 2023-03-26 PROCEDURE — 250N000013 HC RX MED GY IP 250 OP 250 PS 637: Performed by: HOSPITALIST

## 2023-03-26 PROCEDURE — 96376 TX/PRO/DX INJ SAME DRUG ADON: CPT

## 2023-03-26 RX ORDER — NALOXONE HYDROCHLORIDE 0.4 MG/ML
0.4 INJECTION, SOLUTION INTRAMUSCULAR; INTRAVENOUS; SUBCUTANEOUS
Status: DISCONTINUED | OUTPATIENT
Start: 2023-03-26 | End: 2023-03-29 | Stop reason: HOSPADM

## 2023-03-26 RX ORDER — PANTOPRAZOLE SODIUM 40 MG/1
40 TABLET, DELAYED RELEASE ORAL 2 TIMES DAILY
Status: DISCONTINUED | OUTPATIENT
Start: 2023-03-26 | End: 2023-03-29 | Stop reason: HOSPADM

## 2023-03-26 RX ORDER — NALOXONE HYDROCHLORIDE 0.4 MG/ML
0.2 INJECTION, SOLUTION INTRAMUSCULAR; INTRAVENOUS; SUBCUTANEOUS
Status: DISCONTINUED | OUTPATIENT
Start: 2023-03-26 | End: 2023-03-29 | Stop reason: HOSPADM

## 2023-03-26 RX ORDER — POTASSIUM CHLORIDE 750 MG/1
10 TABLET, EXTENDED RELEASE ORAL 2 TIMES DAILY
Status: DISCONTINUED | OUTPATIENT
Start: 2023-03-26 | End: 2023-03-29 | Stop reason: HOSPADM

## 2023-03-26 RX ORDER — LIDOCAINE 4 G/G
2 PATCH TOPICAL
Status: DISCONTINUED | OUTPATIENT
Start: 2023-03-27 | End: 2023-03-29 | Stop reason: HOSPADM

## 2023-03-26 RX ORDER — ACETAMINOPHEN 500 MG
1000 TABLET ORAL EVERY 8 HOURS
Status: DISCONTINUED | OUTPATIENT
Start: 2023-03-26 | End: 2023-03-29 | Stop reason: HOSPADM

## 2023-03-26 RX ADMIN — ACETAMINOPHEN 650 MG: 325 TABLET, FILM COATED ORAL at 12:18

## 2023-03-26 RX ADMIN — PANTOPRAZOLE SODIUM 40 MG: 40 INJECTION, POWDER, FOR SOLUTION INTRAVENOUS at 08:46

## 2023-03-26 RX ADMIN — DICLOFENAC SODIUM 2 G: 10 GEL TOPICAL at 20:17

## 2023-03-26 RX ADMIN — GABAPENTIN 100 MG: 100 CAPSULE ORAL at 20:18

## 2023-03-26 RX ADMIN — GABAPENTIN 100 MG: 100 CAPSULE ORAL at 08:46

## 2023-03-26 RX ADMIN — DICLOFENAC SODIUM 2 G: 10 GEL TOPICAL at 18:22

## 2023-03-26 RX ADMIN — TRAMADOL HYDROCHLORIDE 25 MG: 50 TABLET ORAL at 16:03

## 2023-03-26 RX ADMIN — POTASSIUM CHLORIDE 10 MEQ: 750 TABLET, FILM COATED, EXTENDED RELEASE ORAL at 20:18

## 2023-03-26 RX ADMIN — AMLODIPINE BESYLATE 10 MG: 10 TABLET ORAL at 20:17

## 2023-03-26 RX ADMIN — CITALOPRAM HYDROBROMIDE 20 MG: 20 TABLET ORAL at 08:46

## 2023-03-26 RX ADMIN — HYDROCHLOROTHIAZIDE 25 MG: 25 TABLET ORAL at 08:46

## 2023-03-26 RX ADMIN — ACETAMINOPHEN 1000 MG: 500 TABLET ORAL at 20:17

## 2023-03-26 RX ADMIN — LISINOPRIL 40 MG: 40 TABLET ORAL at 08:46

## 2023-03-26 RX ADMIN — PANTOPRAZOLE SODIUM 40 MG: 40 TABLET, DELAYED RELEASE ORAL at 20:17

## 2023-03-26 ASSESSMENT — ACTIVITIES OF DAILY LIVING (ADL)
ADLS_ACUITY_SCORE: 37

## 2023-03-26 NOTE — PROGRESS NOTES
[facility]  Hospitalist Progress Note  Parisa Calles MD 03/26/2023    Reason for Stay (Diagnosis): back pain, transient loss of bladder control, ruq abdominal pain with cholelithiasis         Assessment and Plan:      Summary of Stay: Jackie Gauthier is a 89 year old female with a history of htn, large hiatal hernia on PPI, chronic episodic epigastric pain, chronic low back pain admitted on 3/25/2023 with mid thoracic back pain with radiation around to the front, very tender ruq pain of unclear etiology and duration, and 2 episodes of spontaneous urinary incontinence    On the night prior to presentation while watching tv she noticed that she was having some discomfort in her back. Would radiate up her whole back.  No sob/cp but does report intermittent pain in the solar plexus which resolves when she massages it.  She went to bed and her pain was even worse the next morning. Worsens with movement, some radiation around her belly chest area.  She denies any antecedent falls, twisting injuries.      Then she had 2 episodes where she just lost control of her bladder.  One time was in bed, she didn't even know she was peeing.  Then had another episode when walking when she just started to pee on the floor    ED evaluation:  VSS and labs unremarkable.  UA negative  CT CAP PE:  No PE, large hiatal hernia, RML rounded atelectasis which should get follow-up to ensure not something else, cholelithiasis    MRI thoracic and lumbar spine with recent T9 compression fracture    Since being hospitalized her back pain responded very well to lidoderm patches and tylenol but since the lidoderm patch has been removed her pain is back.  Also her exam is notable for exquisite ttp to ruq to the point that she winces and jumps      Problem List:   Back pain  Thoracic compression fx-recent  I suspect her back pain is related to spontaneous compression fx  Pain control as follows     apap 1 gm tid     lidoderm patch on q am  off q pm     voltaren gel prn til new patch can be placed     Tramadol 25 mg q 6 hours prn          If unsuccessful with above would trial calcitonin   PT to evaluate ambulatory status     Cholelithiasis with exquisite ruq pain  lfts wnl on admission.  Her  Pain was quite surprising to me, and she was not tender on the left side   -check RUQ AUS to r/o GB issue    Transient urinary incontinence  No e/o cauda equina on MRI  -not sure what this represents, have asked urology to comment    Incidental rounded atelectasis seen on admission CT or that's what it appears to be, recommendations are for OP f/u to ensure stability   -will reach out to rads to see when is appropriate follow-up for that     htn on hydrochlorothiazide 25 mg every day, lisinopril 40 mg every day, amlodipine every day  and KCl 10 meq bid  -all resumed and BPs under fair control     Chronic LBP  Typically just on apap at home    Depression   Resumed pta citalopram     Large hiatal hernia  CT notes that almost her entire stomach is in her chest.  She's on PPI regularly   No sob which is good, no great therapies out there, cont with PPI      Covid   S/p 4 shot pfizer series 11/2022 (biv)    DVT Prophylaxis: Pneumatic Compression Devices  Code Status: DNR / DNI  Functional Status: lives alone in a condo, uses a walker in the morning but then is pretty independent the rest of the day, uses a cane for activities outside her condo  Diet: reg texture  Xie: not needed  Access PIV    Disposition Plan   Expected discharge in 1-2 days to tbd once back pain under control, PT eval complete, GB issue defined, transient urinary incontinence evaluated.     Entered: Parisa Calles MD 03/26/2023, 2:36 PM       Securely message with [employer] (more info)  Text page via [employer] Paging/Directory       I spent 60 minutes reviewing epic including prior labs/imaging/medical history and notes related to this encounter  In addition time was spent in interveiwing the patient,  "communicating with contacts, and medical decision making      Interval History (Subjective):      Still with back pain,     On the night prior to presentation while watching tv she noticed that she was having some discomfort in her back. Would radiate up her whole back.  No sob/cp but does report intermittent pain in the solar plexus which resolves when she massages it.  She went to bed and her pain was even worse the next morning. Worsens with movement, some radiation around her belly chest area.  She denies any antecedent falls, twisting injuries.      Then she had 2 episodes where she just lost control of her bladder.  One time was in bed, she didn't even know she was peeing.  Then had another episode when walking when she just started to pee on the floor    ED evaluation:  VSS and labs unremarkable.  UA negative  CT CAP PE:  No PE, large hiatal hernia, RML rounded atelectasis which should get follow-up to ensure not something else, cholelithiasis    MRI thoracic and lumbar spine with recent T9 compression fracture    Since being hospitalized her back pain responded very well to lidoderm patches and tylenol but since the lidoderm patch has been removed her pain is back.  Also her exam is notable for exquisite ttp to ruq to the point that she winces and jumps                  Physical Exam:      Last Vital Signs:  /84 (BP Location: Right arm)   Pulse 87   Temp 98.5  F (36.9  C) (Oral)   Resp 18   Ht 1.473 m (4' 10\")   Wt 53.2 kg (117 lb 3.2 oz)   LMP  (LMP Unknown)   SpO2 97%   BMI 24.49 kg/m      I/O:  Laying in bed she appears comfortable and in nad looks < stated age head nc/at sclera lungs cta b nl effort rrr no mrg her belly exam exquisite ruq ttp reproducible multiple times, she had anticipatory wincing, her left uq and epigastric area for the most part were fine.  Skin warm and dry no cyanosis or clubbing alert and oriented affect appropriate valadez            Medications:      All current " medications were reviewed with changes reflected in problem list.         Data:      All new lab and imaging data was reviewed.   Labs:  Recent Labs   Lab 03/26/23  0525      POTASSIUM 3.4   CHLORIDE 102   CO2 29   ANIONGAP 9   GLC 86   BUN 9.1   CR 0.58   GFRESTIMATED 84   PAYTON 9.1     Recent Labs   Lab 03/26/23  0525   WBC 6.7   HGB 12.2   HCT 37.0           Recent Labs   Lab 03/26/23  0525 03/25/23  1434   GLC 86 97     Recent Labs   Lab 03/26/23  0525 03/25/23  1434   AST 26 32   ALT 18 23   ALKPHOS 69 76   BILITOTAL 0.7 0.7      Imaging:   Results for orders placed or performed during the hospital encounter of 03/25/23   CT Chest (PE) Abdomen Pelvis w Contrast    Narrative    EXAM: CT CHEST PE ABDOMEN PELVIS W CONTRAST  LOCATION: [facility]  DATE/TIME: 3/25/2023 4:23 PM    INDICATION: Chest and abdominal pain.  COMPARISON: CT abdomen and pelvis 11/02/2009  TECHNIQUE: CT chest pulmonary angiogram and routine CT abdomen pelvis with IV contrast. Arterial phase through the chest and venous phase through the abdomen and pelvis. Multiplanar reformats and MIP reconstructions were performed. Dose reduction   techniques were used.   CONTRAST: 51mL Isovue 370    FINDINGS:  ANGIOGRAM CHEST: Mildly dilated central pulmonary arteries suggesting pulmonary hypertension. Heterogeneous opacification of several subsegmental branches within the bilateral lower lobes secondary to turbulent flow. No pulmonary emboli are visualized.   Calcified atherosclerotic changes of the thoracic aorta. Per protocol there is no luminal enhancement. No thoracic aortic aneurysm.      LUNGS AND PLEURA: Small bilateral pleural effusions. The right pleural effusion appears loculated. Mild bilateral apical fibrotic changes. Mild, smooth interlobular septal thickening within the bilateral lower upper lobes. This is a nonspecific finding   but may be seen with early interstitial pulmonary edema. Approximately  1.7 x 0.8 cm masslike area of peripheral airspace opacity within the anterior aspect of the right middle lobe. This has dendritic extensions which suggested this may represent rounded   atelectasis.    MEDIASTINUM/AXILLAE: Mildly enlarged hilar and right paratracheal lymph nodes. Large hiatal hernia protrusion of the majority of the stomach into the mediastinum. Marked cardiomegaly.    CORONARY ARTERY CALCIFICATION: Moderate.    HEPATOBILIARY: 2.6 cm lobulated benign cyst arising from the superior aspect of the left hepatic lobe. 2.3 cm gallstone.    PANCREAS: Normal.    SPLEEN: Normal.    ADRENAL GLANDS: Stable 2.9 cm right adrenal nodule representing a benign adenoma. Negative left adrenal.    KIDNEYS/BLADDER: Multiple benign-appearing bilateral renal cysts. The dominant cyst, which arising from the lateral aspect of the right kidney measures 5.5 x 5.5 cm in diameter. These have a benign appearance and no specific follow-up is necessary. No   hydronephrosis.    BOWEL: Normal caliber loops of small bowel. Duodenal diverticulum. Normal caliber colon. Diverticulosis of the descending and sigmoid portions of the colon. No CT evidence for diverticulitis.    LYMPH NODES: Normal.    VASCULATURE: Moderate calcified atherosclerotic changes. No abdominal aortic aneurysm.    PELVIC ORGANS: Coarse calcifications within a mildly prominent uterus likely within uterine fibroids.    MUSCULOSKELETAL: Chronic fractures of the posterior and posterolateral access of the right ninth and 10th ribs. Degenerative changes visualized spine with increased thoracic kyphosis. Degenerative changes bilateral hips. Partially visualized   postoperative changes of a right femoral intramedullary nail with dynamic hip screw fixation. Degenerative changes bilateral shoulders.      Impression    IMPRESSION:  1.  No pulmonary embolism. Dilated central pulmonary arteries suggesting pulmonary hypertension.  2.  Small bilateral pleural effusions. Right  pleural effusion appears loculated.  3.  1.7 cm masslike area of airspace changes within the right middle lobe. This has an appearance suggestive of rounded atelectasis. However recommend follow-up chest CT to ascertain resolution.  4.   Nonspecific mild mediastinal and right hilar adenopathy.  5.  Large hiatal hernia.  6.  Cholelithiasis   MR Thoracic Spine w/o & w Contrast    Narrative    EXAM: MR THORACIC SPINE W/O and W CONTRAST, MR LUMBAR SPINE W/O and W CONTRAST  LOCATION: [facility]  DATE/TIME: 3/25/2023 9:28 PM    INDICATION: Worsening back pain with urinary incontinence  COMPARISON:  CT abdomen pelvis 11/02/2009, CT chest 03/25/2023.  CONTRAST: 5 mL Gadavist  TECHNIQUE:   1) Routine Thoracic Spine MRI without and with IV contrast.  2) Routine Lumbar Spine MRI without and with IV contrast.    FINDINGS:  THORACIC SPINE:  Thoracolumbar scoliosis. Multilevel degenerative listheses in the thoracic spine with mid/lower thoracic kyphosis. Slight endplate irregularities, height loss (less than 50%), enhancement, and prevertebral edema at T9 suggestive of a recent fracture. Few   levels of degenerative endplate edema also demonstrates sclerotic changes on recent CT. Chronic T2 and T3 superior endplate deformities with mild height loss. Remaining vertebral body heights are unremarkable. Multilevel degenerative changes without   high-grade canal stenosis. Small pleural effusions. The large hiatal hernia is better assessed on recent CT.    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 Incompletely formed disc space at L5-S1. Mildly heterogeneous marrow signal. Lumbar levocurvature. Few levels of degenerative listhesis. No suspicious marrow signal. Multilevel degenerative   changes without high-grade canal stenosis. Severe right L1-L2 and bilateral L2-L3 foraminal stenosis. Additional areas of mild foraminal stenosis. The conus terminates at L2-L3. Dorsal paraspinal  and bilateral iliacus muscle atrophy. Intra-abdominal   findings are better assessed on recent CT.       Impression    IMPRESSION:    THORACIC SPINE MRI:  1.  Recent T9 compression fracture with less than 50% vertebral body height loss. Associated enhancement and prevertebral edema. No traumatic subluxation.  2.  Multilevel degenerative changes without high-grade canal stenosis or abnormal cord signal.    LUMBAR SPINE MRI:  1.  Transitional lumbosacral anatomy with sacralization of L5.  2.  Multilevel degenerative changes without high-grade canal stenosis. Foraminal stenosis, as detailed above.   MR Lumbar Spine w/o & w Contrast    Narrative    EXAM: MR THORACIC SPINE W/O and W CONTRAST, MR LUMBAR SPINE W/O and W CONTRAST  LOCATION: [facility]  DATE/TIME: 3/25/2023 9:28 PM    INDICATION: Worsening back pain with urinary incontinence  COMPARISON:  CT abdomen pelvis 11/02/2009, CT chest 03/25/2023.  CONTRAST: 5 mL Gadavist  TECHNIQUE:   1) Routine Thoracic Spine MRI without and with IV contrast.  2) Routine Lumbar Spine MRI without and with IV contrast.    FINDINGS:  THORACIC SPINE:  Thoracolumbar scoliosis. Multilevel degenerative listheses in the thoracic spine with mid/lower thoracic kyphosis. Slight endplate irregularities, height loss (less than 50%), enhancement, and prevertebral edema at T9 suggestive of a recent fracture. Few   levels of degenerative endplate edema also demonstrates sclerotic changes on recent CT. Chronic T2 and T3 superior endplate deformities with mild height loss. Remaining vertebral body heights are unremarkable. Multilevel degenerative changes without   high-grade canal stenosis. Small pleural effusions. The large hiatal hernia is better assessed on recent CT.    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 Incompletely formed disc space at L5-S1. Mildly heterogeneous marrow signal. Lumbar levocurvature. Few levels of degenerative  listhesis. No suspicious marrow signal. Multilevel degenerative   changes without high-grade canal stenosis. Severe right L1-L2 and bilateral L2-L3 foraminal stenosis. Additional areas of mild foraminal stenosis. The conus terminates at L2-L3. Dorsal paraspinal and bilateral iliacus muscle atrophy. Intra-abdominal   findings are better assessed on recent CT.       Impression    IMPRESSION:    THORACIC SPINE MRI:  1.  Recent T9 compression fracture with less than 50% vertebral body height loss. Associated enhancement and prevertebral edema. No traumatic subluxation.  2.  Multilevel degenerative changes without high-grade canal stenosis or abnormal cord signal.    LUMBAR SPINE MRI:  1.  Transitional lumbosacral anatomy with sacralization of L5.  2.  Multilevel degenerative changes without high-grade canal stenosis. Foraminal stenosis, as detailed above.

## 2023-03-26 NOTE — PLAN OF CARE
PRIMARY DIAGNOSIS: Low back pain, Pulmonary hypertension , Hiatal hernia  OUTPATIENT/OBSERVATION GOALS TO BE MET BEFORE DISCHARGE:  1. Pain Status: Improved-controlled with oral pain medications.    2. Return to near baseline physical activity: No    3. Cleared for discharge by consultants (if involved): No    Discharge Planner Nurse   Safe discharge environment identified: Yes  Barriers to discharge: Yes       Entered by: Jose G Oliver RN 03/26/2023 1:05 AM     Please review provider order for any additional goals.   Nurse to notify provider when observation goals have been met and patient is ready for discharge.

## 2023-03-26 NOTE — PLAN OF CARE
Goal Outcome Evaluation:    PRIMARY DIAGNOSIS: ACUTE PAIN  OUTPATIENT/OBSERVATION GOALS TO BE MET BEFORE DISCHARGE:  1. Pain Status: Improved- requiring PO medication     2. Return to near baseline physical activity: Yes     3. Cleared for discharge by consultants (if involved): No     Discharge Planner Nurse   Safe discharge environment identified: Yes  Barriers to discharge: Yes       Entered by: Karolyn Painting RN 03/26/2023 12:45 pm  Please review provider order for any additional goals.   Nurse to notify provider when observation goals have been met and patient is ready for discharge.

## 2023-03-26 NOTE — PLAN OF CARE
ROOM # 208-1    Living Situation : Home alone -Independent   Facility name: N/A  : Armando ( Son) [phone]    Activity level at baseline: Independent   Activity level on admit: A X 2     Who will be transporting you at discharge: Armando ( Son)    Patient registered to observation; given Patient Bill of Rights; given the opportunity to ask questions about observation status and their plan of care.  Patient has been oriented to the observation room, bathroom and call light is in place.    Discussed discharge goals and expectations with patient/family.

## 2023-03-26 NOTE — PLAN OF CARE
"PRIMARY DIAGNOSIS: Low back pain, Pulmonary hypertension , Hiatal hernia  OUTPATIENT/OBSERVATION GOALS TO BE MET BEFORE DISCHARGE:  1. Pain Status: Improved-controlled with oral pain medications.    2. Return to near baseline physical activity: No    3. Cleared for discharge by consultants (if involved): No    Discharge Planner Nurse   Safe discharge environment identified: Yes  Barriers to discharge: Yes   A & O X 4. Pleasant. Lung sound clear bilaterally to auscultation . Denies chest pain, shortness of breath, lightheadedness, nausea, or vomit. Bowel sound present all quadrants and active.  A X 2 . Pure wick in place with adequate output. Wears hearing aid bilaterally.  On low fat diet. Peripheral IV saline lock. Pain is managed with Tylenol, lidocaine patch . Patient on IV PPI. UA negative. MRI of thoracic and Lumbar spine results pending. Afebrile.  /65 (BP Location: Right arm)   Pulse 81   Temp 97.6  F (36.4  C) (Oral)   Resp 16   Ht 1.473 m (4' 10\")   Wt 53.2 kg (117 lb 3.2 oz)   LMP  (LMP Unknown)   SpO2 90%   BMI 24.49 kg/m         Entered by: Jose G Oliver RN 03/26/2023 2:07 AM     Please review provider order for any additional goals.   Nurse to notify provider when observation goals have been met and patient is ready for discharge.         "

## 2023-03-26 NOTE — PLAN OF CARE
Goal Outcome Evaluation:    PRIMARY DIAGNOSIS: ACUTE PAIN  OUTPATIENT/OBSERVATION GOALS TO BE MET BEFORE DISCHARGE:  1. Pain Status: Improved- requiring PO medication     2. Return to near baseline physical activity: Yes     3. Cleared for discharge by consultants (if involved): No     Discharge Planner Nurse   Safe discharge environment identified: Yes  Barriers to discharge: Yes       Entered by: Karolyn Painting RN 03/26/2023 5:30 PM  Please review provider order for any additional goals.   Nurse to notify provider when observation goals have been met and patient is ready for discharge.    Pt resting comfortably. VSS. A&O. Pain managed with tylenol, tramadol, and voltaren gel. Transfers with Ax1. Continent of urine. Tolerating reg diet. Plan for Ultrasound tomorrow, NPO at 0000.

## 2023-03-26 NOTE — PLAN OF CARE
PRIMARY DIAGNOSIS: ACUTE PAIN  OUTPATIENT/OBSERVATION GOALS TO BE MET BEFORE DISCHARGE:  1. Pain Status: Pain free.    2. Return to near baseline physical activity: Yes    3. Cleared for discharge by consultants (if involved): No    Discharge Planner Nurse   Safe discharge environment identified: Yes  Barriers to discharge: Yes       Entered by: Karolyn Painting RN 03/26/2023 9:15 AM     Please review provider order for any additional goals.   Nurse to notify provider when observation goals have been met and patient is ready for discharge.

## 2023-03-27 ENCOUNTER — APPOINTMENT (OUTPATIENT)
Dept: ULTRASOUND IMAGING | Facility: CLINIC | Age: 88
End: 2023-03-27
Attending: INTERNAL MEDICINE
Payer: MEDICARE

## 2023-03-27 ENCOUNTER — APPOINTMENT (OUTPATIENT)
Dept: PHYSICAL THERAPY | Facility: CLINIC | Age: 88
End: 2023-03-27
Attending: INTERNAL MEDICINE
Payer: MEDICARE

## 2023-03-27 LAB
ALBUMIN SERPL BCG-MCNC: 3.9 G/DL (ref 3.5–5.2)
ALP SERPL-CCNC: 72 U/L (ref 35–104)
ALT SERPL W P-5'-P-CCNC: 19 U/L (ref 10–35)
ANION GAP SERPL CALCULATED.3IONS-SCNC: 11 MMOL/L (ref 7–15)
AST SERPL W P-5'-P-CCNC: 26 U/L (ref 10–35)
ATRIAL RATE - MUSE: 326 BPM
ATRIAL RATE - MUSE: 98 BPM
BILIRUB SERPL-MCNC: 0.7 MG/DL
BUN SERPL-MCNC: 12.4 MG/DL (ref 8–23)
CALCIUM SERPL-MCNC: 9.4 MG/DL (ref 8.2–9.6)
CHLORIDE SERPL-SCNC: 97 MMOL/L (ref 98–107)
CREAT SERPL-MCNC: 0.64 MG/DL (ref 0.51–0.95)
DEPRECATED HCO3 PLAS-SCNC: 28 MMOL/L (ref 22–29)
DIASTOLIC BLOOD PRESSURE - MUSE: NORMAL MMHG
DIASTOLIC BLOOD PRESSURE - MUSE: NORMAL MMHG
ERYTHROCYTE [DISTWIDTH] IN BLOOD BY AUTOMATED COUNT: 12.7 % (ref 10–15)
GFR SERPL CREATININE-BSD FRML MDRD: 82 ML/MIN/1.73M2
GLUCOSE SERPL-MCNC: 102 MG/DL (ref 70–99)
HCT VFR BLD AUTO: 38 % (ref 35–47)
HGB BLD-MCNC: 12.7 G/DL (ref 11.7–15.7)
INTERPRETATION ECG - MUSE: NORMAL
INTERPRETATION ECG - MUSE: NORMAL
MCH RBC QN AUTO: 33.2 PG (ref 26.5–33)
MCHC RBC AUTO-ENTMCNC: 33.4 G/DL (ref 31.5–36.5)
MCV RBC AUTO: 99 FL (ref 78–100)
P AXIS - MUSE: NORMAL DEGREES
P AXIS - MUSE: NORMAL DEGREES
PLATELET # BLD AUTO: 158 10E3/UL (ref 150–450)
POTASSIUM SERPL-SCNC: 3.4 MMOL/L (ref 3.4–5.3)
PR INTERVAL - MUSE: NORMAL MS
PR INTERVAL - MUSE: NORMAL MS
PROT SERPL-MCNC: 6.1 G/DL (ref 6.4–8.3)
QRS DURATION - MUSE: 86 MS
QRS DURATION - MUSE: 92 MS
QT - MUSE: 406 MS
QT - MUSE: 414 MS
QTC - MUSE: 471 MS
QTC - MUSE: 517 MS
R AXIS - MUSE: -23 DEGREES
R AXIS - MUSE: -30 DEGREES
RBC # BLD AUTO: 3.83 10E6/UL (ref 3.8–5.2)
SODIUM SERPL-SCNC: 136 MMOL/L (ref 136–145)
SYSTOLIC BLOOD PRESSURE - MUSE: NORMAL MMHG
SYSTOLIC BLOOD PRESSURE - MUSE: NORMAL MMHG
T AXIS - MUSE: -69 DEGREES
T AXIS - MUSE: 270 DEGREES
VENTRICULAR RATE- MUSE: 81 BPM
VENTRICULAR RATE- MUSE: 94 BPM
WBC # BLD AUTO: 7.6 10E3/UL (ref 4–11)

## 2023-03-27 PROCEDURE — 76705 ECHO EXAM OF ABDOMEN: CPT

## 2023-03-27 PROCEDURE — G0378 HOSPITAL OBSERVATION PER HR: HCPCS

## 2023-03-27 PROCEDURE — 99232 SBSQ HOSP IP/OBS MODERATE 35: CPT | Performed by: INTERNAL MEDICINE

## 2023-03-27 PROCEDURE — 250N000013 HC RX MED GY IP 250 OP 250 PS 637: Performed by: HOSPITALIST

## 2023-03-27 PROCEDURE — 36415 COLL VENOUS BLD VENIPUNCTURE: CPT | Performed by: INTERNAL MEDICINE

## 2023-03-27 PROCEDURE — 80053 COMPREHEN METABOLIC PANEL: CPT | Performed by: INTERNAL MEDICINE

## 2023-03-27 PROCEDURE — 85018 HEMOGLOBIN: CPT | Performed by: INTERNAL MEDICINE

## 2023-03-27 PROCEDURE — 99221 1ST HOSP IP/OBS SF/LOW 40: CPT | Mod: FS | Performed by: STUDENT IN AN ORGANIZED HEALTH CARE EDUCATION/TRAINING PROGRAM

## 2023-03-27 PROCEDURE — 97161 PT EVAL LOW COMPLEX 20 MIN: CPT | Mod: GP | Performed by: PHYSICAL THERAPIST

## 2023-03-27 PROCEDURE — 250N000013 HC RX MED GY IP 250 OP 250 PS 637: Performed by: INTERNAL MEDICINE

## 2023-03-27 RX ADMIN — GABAPENTIN 100 MG: 100 CAPSULE ORAL at 20:42

## 2023-03-27 RX ADMIN — DICLOFENAC SODIUM 2 G: 10 GEL TOPICAL at 20:41

## 2023-03-27 RX ADMIN — PANTOPRAZOLE SODIUM 40 MG: 40 TABLET, DELAYED RELEASE ORAL at 08:31

## 2023-03-27 RX ADMIN — DICLOFENAC SODIUM 2 G: 10 GEL TOPICAL at 12:05

## 2023-03-27 RX ADMIN — LISINOPRIL 40 MG: 40 TABLET ORAL at 08:31

## 2023-03-27 RX ADMIN — CITALOPRAM HYDROBROMIDE 20 MG: 20 TABLET ORAL at 08:31

## 2023-03-27 RX ADMIN — ACETAMINOPHEN 1000 MG: 500 TABLET ORAL at 05:00

## 2023-03-27 RX ADMIN — GABAPENTIN 100 MG: 100 CAPSULE ORAL at 08:31

## 2023-03-27 RX ADMIN — DICLOFENAC SODIUM 2 G: 10 GEL TOPICAL at 16:06

## 2023-03-27 RX ADMIN — POTASSIUM CHLORIDE 10 MEQ: 750 TABLET, FILM COATED, EXTENDED RELEASE ORAL at 20:42

## 2023-03-27 RX ADMIN — POTASSIUM CHLORIDE 10 MEQ: 750 TABLET, FILM COATED, EXTENDED RELEASE ORAL at 08:31

## 2023-03-27 RX ADMIN — ACETAMINOPHEN 1000 MG: 500 TABLET ORAL at 20:42

## 2023-03-27 RX ADMIN — AMLODIPINE BESYLATE 10 MG: 10 TABLET ORAL at 20:42

## 2023-03-27 RX ADMIN — HYDROCHLOROTHIAZIDE 25 MG: 25 TABLET ORAL at 08:31

## 2023-03-27 RX ADMIN — TRAMADOL HYDROCHLORIDE 25 MG: 50 TABLET ORAL at 20:41

## 2023-03-27 RX ADMIN — PANTOPRAZOLE SODIUM 40 MG: 40 TABLET, DELAYED RELEASE ORAL at 20:42

## 2023-03-27 RX ADMIN — ACETAMINOPHEN 1000 MG: 500 TABLET ORAL at 12:04

## 2023-03-27 RX ADMIN — DICLOFENAC SODIUM 2 G: 10 GEL TOPICAL at 08:34

## 2023-03-27 RX ADMIN — LIDOCAINE PATCH 4% 2 PATCH: 40 PATCH TOPICAL at 08:32

## 2023-03-27 ASSESSMENT — ACTIVITIES OF DAILY LIVING (ADL)
ADLS_ACUITY_SCORE: 37
ADLS_ACUITY_SCORE: 31
ADLS_ACUITY_SCORE: 37

## 2023-03-27 NOTE — PROGRESS NOTES
"   03/27/23 1324   Appointment Info   Signing Clinician's Name / Credentials (PT) Rupinder Alvarez DPT   Living Environment   Living Environment Comments Lives alone in condo with elevator access; no stairs to enter from outside; IND with basic ADLs, has  every other week; son provides transportation/takes grocery shopping; uses 4WW first thing in the morning and then can usually progress to no AD as the day progresses   Self-Care   Usual Activity Tolerance good   Current Activity Tolerance good   Equipment Currently Used at Home cane, straight;walker, rolling   Fall history within last six months no   General Information   Onset of Illness/Injury or Date of Surgery 03/25/23   Referring Physician Parisa Calles MD   Patient/Family Therapy Goals Statement (PT) to go home and have less rib pain   Pertinent History of Current Problem (include personal factors and/or comorbidities that impact the POC) Jackie Gauthier is a 89 year old female with a history of htn, large hiatal hernia on PPI, chronic episodic epigastric pain, chronic low back pain admitted on 3/25/2023 with mid thoracic back pain with radiation around to the front, very tender ruq pain of unclear etiology and duration, and 2 episodes of spontaneous urinary incontinence   Existing Precautions/Restrictions spinal  (rec 2/2 back pain)   General Observations Supine, NAD   Cognition   Affect/Mental Status (Cognition) WFL   Orientation Status (Cognition) oriented x 4   Follows Commands (Cognition) WFL   Pain Assessment   Patient Currently in Pain   (did not rate 0-10, states \"tolerable\" pain in ribs and improved pain in back from PTA)   Posture    Posture Forward head position;Protracted shoulders;Kyphosis   Range of Motion (ROM)   Range of Motion ROM is WFL   Strength (Manual Muscle Testing)   Strength (Manual Muscle Testing) strength is WFL   Bed Mobility   Bed Mobility no deficits identified   Comment, (Bed Mobility) aware of log roll "   Transfers   Transfers sit-stand transfer   Sit-Stand Transfer   Sit-Stand [place] (Transfers) modified independence   Assistive Device (Sit-Stand Transfers) walker, front-wheeled   Gait/Stairs (Locomotion)   [place] Level (Gait) contact guard  (progressed to supervision)   Assistive Device (Gait) walker, front-wheeled   Distance in Feet >200'   Pattern (Gait) step-through   Balance   Balance Comments no overt LOB/lateral path deviation noted   Sensory Examination   Sensory Perception patient reports no sensory changes   Clinical Impression   Criteria for Skilled Therapeutic Intervention Evaluation only   Clinical Presentation (PT Evaluation Complexity) Stable/Uncomplicated   Clinical Presentation Rationale clinical judgement   Clinical Decision Making (Complexity) low complexity   Risk & Benefits of therapy have been explained evaluation/treatment results reviewed;current/potential barriers reviewed;care plan/treatment goals reviewed;risks/benefits reviewed;participants voiced agreement with care plan;participants included;patient;son   PT Total Evaluation Time   PT Eval, Low Complexity Minutes (95118) 18   PT Discharge Planning   PT Plan no acute PT goals identified   PT Discharge Recommendation (DC Rec) home with assist   PT Rationale for DC Rec Pt moving at baseline for mobility per son's report and PT assessment.  Pt does not demo need for HHPT/OPPT at this time and has appropriate access to equipment at home upon discharge.  Rec discharge home with prior level of assist from son (transportation/groceries) and housekeeping assist.   PT Brief overview of current status supervision with 2WW d/t poor vision from macular degneration   Total Session Time   Total Session Time (sum of timed and untimed services) 18

## 2023-03-27 NOTE — PROGRESS NOTES
[facility]  Hospitalist Progress Note  Parisa Calles MD 03/27/2023    Reason for Stay (Diagnosis): back pain, transient loss of bladder control, ruq abdominal pain with cholelithiasis         Assessment and Plan:      Summary of Stay: Jackie Gauthier is a 89 year old female with a history of htn, large hiatal hernia on PPI, chronic episodic epigastric pain, chronic low back pain admitted on 3/25/2023 with mid thoracic back pain with radiation around to the front, very tender ruq pain of unclear etiology and duration, and 2 episodes of spontaneous urinary incontinence    On the night prior to presentation while watching tv she noticed that she was having some discomfort in her back. Would radiate up her whole back.  No sob/cp but does report intermittent pain in the solar plexus which resolves when she massages it.  She went to bed and her pain was even worse the next morning. Worsens with movement, some radiation around her belly chest area.  She denies any antecedent falls, twisting injuries.      Then she had 2 episodes where she just lost control of her bladder.  One time was in bed, she didn't even know she was peeing.  Then had another episode when walking when she just started to pee on the floor    ED evaluation:  VSS and labs unremarkable.  UA negative  CT CAP PE:  No PE, large hiatal hernia, RML rounded atelectasis which should get follow-up to ensure not something else, cholelithiasis    MRI thoracic and lumbar spine with recent T9 compression fracture  Her hospitalization is also notable for pretty servere ruq/lower rib pain.  Given the cholelithiasis seen on CT I ordered US which was negative for GB issue.  I reviewed her CT with radiologist who do not see any lower rib fx or other etiology for the pain.  Her skin is normal-no e/o shingles.  Temporally in coincided with the back pain but doesn't sound like nerve pain-more achy than sharp shooting/burning    She is actually moving quite well  and should be able to discharge back to her condo.  She'd like her various pains under better control but is aiming for tomorrow      Problem List:   Back pain  Thoracic compression fx-recent  I suspect her back pain is related to spontaneous compression fx  Pain control as follows     apap 1 gm tid     lidoderm patch on q am off q pm     Gabapentin 100 mg bid      Tramadol 25 mg q 6 hours prn          If unsuccessful with above would trial calcitonin   Overall back pain is under good control   PT to evaluate ambulatory status and she's doing well - I witnessed her ambulating in the [place] with a walker without any difficulties     Cholelithiasis with exquisite ruq pain  lfts wnl on admission.  Her  Pain was quite surprising to me, and she was not tender on the left side   -US negative for GB issues, skin exam is normal.  , I reviewed her CT scan with the radiologist and no findings to account for her pain.  Has chronic posterior rib fractures.  And has ? Anterior 4th rib fracture-I pushed over this rib and no pain at all so not clear what her pain is from.  She expressed that she wanted to know what this pain is from.  I told her that sometimes we just don't know.  I do suspect it's MSK in origin and that's why we can't find anything in her imaging.  She does state that heating pad helps   -trial voltaren gel     Transient urinary incontinence  No e/o cauda equina on MRI  -appreciate urology input    Incidental rounded atelectasis seen on admission CT or that's what it appears to be, recommendations are for OP f/u to ensure stability   -consider repeat CT in 4-6 weeks, I neglected to discuss this with her and her son today     htn on hydrochlorothiazide 25 mg every day, lisinopril 40 mg every day, amlodipine every day  and KCl 10 meq bid  -all resumed and BPs under fair control     Chronic LBP  Typically just on apap at home    Depression   Resumed pta citalopram     Large hiatal hernia  CT notes that almost her  "entire stomach is in her chest.  She's on PPI regularly   No sob which is good, no great therapies out there, cont with PPI      Covid   S/p 4 shot pfizer series 11/2022 (biv)    DVT Prophylaxis: Pneumatic Compression Devices  Code Status: DNR / DNI  Functional Status: lives alone in a condo, uses a walker in the morning but then is pretty independent the rest of the day, uses a cane for activities outside her condo  Diet: reg texture  Xie: not needed  Access PIV    Disposition Plan   Expected discharge in 1-2 days to tbd once back pain under control, PT eval complete, GB issue defined, transient urinary incontinence evaluated.     Entered: Parisa Calles MD 03/27/2023, 4:43 PM       Securely message with [employer] (more info)  Text page via [email or web address] Paging/Directory     Discussed with patient and son at the bedside    I spent 45 minutes reviewing epic including prior labs/imaging/medical history and notes related to this encounter  In addition time was spent in interveiwing the patient, communicating with contacts, and medical decision making      Interval History (Subjective):      Back pain ok and feeling better today.  No cp no n/v. Still with ruq pain       Physical Exam:      Last Vital Signs:  BP (!) 142/79 (BP Location: Left arm, Patient Position: Supine)   Pulse 72   Temp 98.5  F (36.9  C) (Oral)   Resp 16   Ht 1.473 m (4' 10\")   Wt 53.2 kg (117 lb 3.2 oz)   LMP  (LMP Unknown)   SpO2 96%   BMI 24.49 kg/m      I/O:  Laying in bed she appears comfortable and in nad looks < stated age head nc/at sclera lungs cta b nl effort rrr no mrg her belly exam she has persistent ruq pain - although seems less intense today, skin is w/d no rashes, she her left uq and epigastric area for the most part were fine.  Skin warm and dry no cyanosis or clubbing alert and oriented affect appropriate valadez            Medications:      All current medications were reviewed with changes reflected in problem list.         Data:    "   All new lab and imaging data was reviewed.   Labs:  Recent Labs   Lab 03/27/23 0527      POTASSIUM 3.4   CHLORIDE 97*   CO2 28   ANIONGAP 11   *   BUN 12.4   CR 0.64   GFRESTIMATED 82   PAYTON 9.4     Recent Labs   Lab 03/27/23 0527   WBC 7.6   HGB 12.7   HCT 38.0   MCV 99        Recent Labs   Lab 03/27/23  0527 03/26/23  0525 03/25/23  1434   * 86 97     Recent Labs   Lab 03/27/23  0527 03/26/23  0525 03/25/23  1434   AST 26 26 32   ALT 19 18 23   ALKPHOS 72 69 76   BILITOTAL 0.7 0.7 0.7      Imaging:   Results for orders placed or performed during the hospital encounter of 03/25/23   CT Chest (PE) Abdomen Pelvis w Contrast    Narrative    EXAM: CT CHEST PE ABDOMEN PELVIS W CONTRAST  LOCATION: [facility]  DATE/TIME: 3/25/2023 4:23 PM    INDICATION: Chest and abdominal pain.  COMPARISON: CT abdomen and pelvis 11/02/2009  TECHNIQUE: CT chest pulmonary angiogram and routine CT abdomen pelvis with IV contrast. Arterial phase through the chest and venous phase through the abdomen and pelvis. Multiplanar reformats and MIP reconstructions were performed. Dose reduction   techniques were used.   CONTRAST: 51mL Isovue 370    FINDINGS:  ANGIOGRAM CHEST: Mildly dilated central pulmonary arteries suggesting pulmonary hypertension. Heterogeneous opacification of several subsegmental branches within the bilateral lower lobes secondary to turbulent flow. No pulmonary emboli are visualized.   Calcified atherosclerotic changes of the thoracic aorta. Per protocol there is no luminal enhancement. No thoracic aortic aneurysm.      LUNGS AND PLEURA: Small bilateral pleural effusions. The right pleural effusion appears loculated. Mild bilateral apical fibrotic changes. Mild, smooth interlobular septal thickening within the bilateral lower upper lobes. This is a nonspecific finding   but may be seen with early interstitial pulmonary edema. Approximately 1.7 x 0.8 cm masslike area of  peripheral airspace opacity within the anterior aspect of the right middle lobe. This has dendritic extensions which suggested this may represent rounded   atelectasis.    MEDIASTINUM/AXILLAE: Mildly enlarged hilar and right paratracheal lymph nodes. Large hiatal hernia protrusion of the majority of the stomach into the mediastinum. Marked cardiomegaly.    CORONARY ARTERY CALCIFICATION: Moderate.    HEPATOBILIARY: 2.6 cm lobulated benign cyst arising from the superior aspect of the left hepatic lobe. 2.3 cm gallstone.    PANCREAS: Normal.    SPLEEN: Normal.    ADRENAL GLANDS: Stable 2.9 cm right adrenal nodule representing a benign adenoma. Negative left adrenal.    KIDNEYS/BLADDER: Multiple benign-appearing bilateral renal cysts. The dominant cyst, which arising from the lateral aspect of the right kidney measures 5.5 x 5.5 cm in diameter. These have a benign appearance and no specific follow-up is necessary. No   hydronephrosis.    BOWEL: Normal caliber loops of small bowel. Duodenal diverticulum. Normal caliber colon. Diverticulosis of the descending and sigmoid portions of the colon. No CT evidence for diverticulitis.    LYMPH NODES: Normal.    VASCULATURE: Moderate calcified atherosclerotic changes. No abdominal aortic aneurysm.    PELVIC ORGANS: Coarse calcifications within a mildly prominent uterus likely within uterine fibroids.    MUSCULOSKELETAL: Chronic fractures of the posterior and posterolateral access of the right ninth and 10th ribs. Degenerative changes visualized spine with increased thoracic kyphosis. Degenerative changes bilateral hips. Partially visualized   postoperative changes of a right femoral intramedullary nail with dynamic hip screw fixation. Degenerative changes bilateral shoulders.      Impression    IMPRESSION:  1.  No pulmonary embolism. Dilated central pulmonary arteries suggesting pulmonary hypertension.  2.  Small bilateral pleural effusions. Right pleural effusion appears  loculated.  3.  1.7 cm masslike area of airspace changes within the right middle lobe. This has an appearance suggestive of rounded atelectasis. However recommend follow-up chest CT to ascertain resolution.  4.   Nonspecific mild mediastinal and right hilar adenopathy.  5.  Large hiatal hernia.  6.  Cholelithiasis   MR Thoracic Spine w/o & w Contrast    Narrative    EXAM: MR THORACIC SPINE W/O and W CONTRAST, MR LUMBAR SPINE W/O and W CONTRAST  LOCATION: [facility]  DATE/TIME: 3/25/2023 9:28 PM    INDICATION: Worsening back pain with urinary incontinence  COMPARISON:  CT abdomen pelvis 11/02/2009, CT chest 03/25/2023.  CONTRAST: 5 mL Gadavist  TECHNIQUE:   1) Routine Thoracic Spine MRI without and with IV contrast.  2) Routine Lumbar Spine MRI without and with IV contrast.    FINDINGS:  THORACIC SPINE:  Thoracolumbar scoliosis. Multilevel degenerative listheses in the thoracic spine with mid/lower thoracic kyphosis. Slight endplate irregularities, height loss (less than 50%), enhancement, and prevertebral edema at T9 suggestive of a recent fracture. Few   levels of degenerative endplate edema also demonstrates sclerotic changes on recent CT. Chronic T2 and T3 superior endplate deformities with mild height loss. Remaining vertebral body heights are unremarkable. Multilevel degenerative changes without   high-grade canal stenosis. Small pleural effusions. The large hiatal hernia is better assessed on recent CT.    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 Incompletely formed disc space at L5-S1. Mildly heterogeneous marrow signal. Lumbar levocurvature. Few levels of degenerative listhesis. No suspicious marrow signal. Multilevel degenerative   changes without high-grade canal stenosis. Severe right L1-L2 and bilateral L2-L3 foraminal stenosis. Additional areas of mild foraminal stenosis. The conus terminates at L2-L3. Dorsal paraspinal and bilateral iliacus  muscle atrophy. Intra-abdominal   findings are better assessed on recent CT.       Impression    IMPRESSION:    THORACIC SPINE MRI:  1.  Recent T9 compression fracture with less than 50% vertebral body height loss. Associated enhancement and prevertebral edema. No traumatic subluxation.  2.  Multilevel degenerative changes without high-grade canal stenosis or abnormal cord signal.    LUMBAR SPINE MRI:  1.  Transitional lumbosacral anatomy with sacralization of L5.  2.  Multilevel degenerative changes without high-grade canal stenosis. Foraminal stenosis, as detailed above.   MR Lumbar Spine w/o & w Contrast    Narrative    EXAM: MR THORACIC SPINE W/O and W CONTRAST, MR LUMBAR SPINE W/O and W CONTRAST  LOCATION: [facility]  DATE/TIME: 3/25/2023 9:28 PM    INDICATION: Worsening back pain with urinary incontinence  COMPARISON:  CT abdomen pelvis 11/02/2009, CT chest 03/25/2023.  CONTRAST: 5 mL Gadavist  TECHNIQUE:   1) Routine Thoracic Spine MRI without and with IV contrast.  2) Routine Lumbar Spine MRI without and with IV contrast.    FINDINGS:  THORACIC SPINE:  Thoracolumbar scoliosis. Multilevel degenerative listheses in the thoracic spine with mid/lower thoracic kyphosis. Slight endplate irregularities, height loss (less than 50%), enhancement, and prevertebral edema at T9 suggestive of a recent fracture. Few   levels of degenerative endplate edema also demonstrates sclerotic changes on recent CT. Chronic T2 and T3 superior endplate deformities with mild height loss. Remaining vertebral body heights are unremarkable. Multilevel degenerative changes without   high-grade canal stenosis. Small pleural effusions. The large hiatal hernia is better assessed on recent CT.    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 Incompletely formed disc space at L5-S1. Mildly heterogeneous marrow signal. Lumbar levocurvature. Few levels of degenerative listhesis. No suspicious  marrow signal. Multilevel degenerative   changes without high-grade canal stenosis. Severe right L1-L2 and bilateral L2-L3 foraminal stenosis. Additional areas of mild foraminal stenosis. The conus terminates at L2-L3. Dorsal paraspinal and bilateral iliacus muscle atrophy. Intra-abdominal   findings are better assessed on recent CT.       Impression    IMPRESSION:    THORACIC SPINE MRI:  1.  Recent T9 compression fracture with less than 50% vertebral body height loss. Associated enhancement and prevertebral edema. No traumatic subluxation.  2.  Multilevel degenerative changes without high-grade canal stenosis or abnormal cord signal.    LUMBAR SPINE MRI:  1.  Transitional lumbosacral anatomy with sacralization of L5.  2.  Multilevel degenerative changes without high-grade canal stenosis. Foraminal stenosis, as detailed above.

## 2023-03-27 NOTE — PLAN OF CARE
"PRIMARY DIAGNOSIS: Thoracic back pain, Gallstones, Hiatal hernia.  OUTPATIENT/OBSERVATION GOALS TO BE MET BEFORE DISCHARGE:  1. Pain Status: Improved-controlled with oral pain medications.    2. Return to near baseline physical activity: Yes    3. Cleared for discharge by consultants (if involved): No    Discharge Planner Nurse    Safe discharge environment identified: Yes  Barriers to discharge: Yes   A & O X 4. Pleasant. Lung sound clear bilaterally to auscultation . Denies chest pain, shortness of breath, lightheadedness, nausea, or vomit. Bowel sound present all quadrants and active.  A X 1 with care . Wears hearing aid bilaterally.  NPO status midnight for abdominal US this morning. Peripheral IV saline lock. Pain is managed with Tylenol, Voltaren gel, lidocaine patch, prn tramadol  . Patient on cardiac monitor, cardiac rhythm SR 60s per telemetry tech.Afebrile.   /71 (BP Location: Right arm)   Pulse 77   Temp 97.7  F (36.5  C) (Oral)   Resp 18   Ht 1.473 m (4' 10\")   Wt 53.2 kg (117 lb 3.2 oz)   LMP  (LMP Unknown)   SpO2 95%   BMI 24.49 kg/m            Entered by: Jose G Oliver RN 03/27/2023 05:24     Please review provider order for any additional goals.   Nurse to notify provider when observation goals have been met and patient is ready for discharge.  "

## 2023-03-27 NOTE — PROGRESS NOTES
PRIMARY DIAGNOSIS: ACUTE PAIN  OUTPATIENT/OBSERVATION GOALS TO BE MET BEFORE DISCHARGE:  1. Pain Status: Improved-controlled with oral pain medications, Lidocaine patch, and Voltaren gel.     2. Return to near baseline physical activity: Yes, cleared by PT    3. Cleared for discharge by consultants (if involved): No    Discharge Planner Nurse   Safe discharge environment identified: Yes  Barriers to discharge: Yes      A&Ox4. [place], bilateral hearing aids. Pain in back and RUQ. US of abdomen complete. Tolerating regular diet. Peripheral IV saline locked.

## 2023-03-27 NOTE — PROGRESS NOTES
PRIMARY DIAGNOSIS: ACUTE PAIN  OUTPATIENT/OBSERVATION GOALS TO BE MET BEFORE DISCHARGE:  1. Pain Status: Improved-controlled with oral pain medications, Lidocaine patch, and Voltaren gel.     2. Return to near baseline physical activity: No, SBA with walker due to pain. Independent at baseline.     3. Cleared for discharge by consultants (if involved): No    Discharge Planner Nurse   Safe discharge environment identified: Yes  Barriers to discharge: Yes      A&Ox4. [place], bilateral hearing aids. Pain in back and RUQ. Pending US of abdomen, has been NPO for this. Peripheral IV saline locked.

## 2023-03-27 NOTE — PROGRESS NOTES
Care Management Follow Up    Length of Stay (days): 0    Expected Discharge Date: 03/27/2023     Concerns to be Addressed:    Discharge Planning   Patient plan of care discussed at interdisciplinary rounds: Yes    Anticipated Discharge Disposition:  Home     Additional Information:  Care management following for plan of care and discharge needs.  Met with patient at bedside. Patient does not have any services at this time.   No concerns noted from patient with returning home upon discharge.    Case Management will continue to follow therapy and other discipline recommendations if additional needs arise.      BRET Joseph RN Case Manager  Inpatient Care Coordination  [facility]   [phone]

## 2023-03-27 NOTE — PLAN OF CARE
PRIMARY DIAGNOSIS: Thoracic back pain, Gallstones, Hiatal hernia.  OUTPATIENT/OBSERVATION GOALS TO BE MET BEFORE DISCHARGE:  1. Pain Status: Improved-controlled with oral pain medications.    2. Return to near baseline physical activity: Yes    3. Cleared for discharge by consultants (if involved): No    Discharge Planner Nurse   Safe discharge environment identified: Yes  Barriers to discharge: Yes   Patient NPO status effectively midnight tonight .       Entered by: Jose G Oliver RN 03/27/2023     Please review provider order for any additional goals.   Nurse to notify provider when observation goals have been met and patient is ready for discharge.

## 2023-03-27 NOTE — PROGRESS NOTES
PRIMARY DIAGNOSIS: ACUTE PAIN  OUTPATIENT/OBSERVATION GOALS TO BE MET BEFORE DISCHARGE:  1. Pain Status: Improved-controlled with oral pain medications, Lidocaine patch, and Voltaren gel.     2. Return to near baseline physical activity: Yes, cleared by PT    3. Cleared for discharge by consultants (if involved): No    Discharge Planner Nurse   Safe discharge environment identified: Yes  Barriers to discharge: Yes      A&Ox4. [place], bilateral hearing aids. Pain in back and RUQ. US of abdomen complete, possible gallstones. Tolerating regular diet. Peripheral IV saline locked. Discharge to home once cleared.  Some post void residual, output was 150 mL, bladder scan for 92 mL.

## 2023-03-27 NOTE — CONSULTS
[facility] Consultation by [facility]    Jackie Gauthier MRN# [id number]   Age: 89 year old YOB: 1931     Date of Admission:  3/25/2023    Reason for consult: Transient complete loss of bladder control-now appears resolved       Requesting PA/MD: Dr. Calles       Level of consult: Consult, follow and place orders           Assessment and Plan:   Assessment:   Urinary incontinence  Back pain  History of stroke      Plan:   -Would recommend checking several postvoid residuals to ensure patient is emptying her bladder adequately.  A postvoid residual of 200 mL or less is reasonable.  -We discussed that her urinary leakage could be due to recent exacerbation of back pain.  Also.  She has a history of a stroke.  If she is emptying adequately, we can continue to monitor.  -Possible future treatments for urinary incontinence include medications or pelvic floor physical therapy.  -If she continues to have urinary incontinence and the type is not perfectly clear, would consider formal urodynamic study.  -Lumbar imaging did not show evidence of cauda equina.  -Given that she has had 5 children and a large baby which was 10 pounds, it is actually surprising that she has not previously had issues with urinary incontinence.    Helga Boogie PA-C   [facility]  [phone]               Chief Complaint:   Transient complete loss of bladder control-now appears resolved     History is obtained from the patient and EMR.         History of Present Illness:   This patient is a 89 year old, , female who presented to the emergency department on 2023 due to a history of back and chest pain.  Medical history is significant for stroke, hypertension, and hyperparathyroidism.  She continues to note some back pain.  Patient had an episode of urinary incontinence in bed on Friday.  She also notes that she got up to go the bathroom in the morning the other day and lost complete control of her  bladder.  She denies baseline urinary incontinence.    Patient denies any hematuria, dysuria, urgency, or frequency of urination.  She does note that she has had slightly slower bowel movements recently.  She has not had a bowel movement since Friday.  However, she has not eaten very much.    Patient does have a history of a lumbar fusion.  Lumbar MRI was completed due to new urinary incontinence and back pain.  This is to rule out any evidence of cauda equina.  There was no findings of this.  Does appear that her thoracic spine appears to have had a fracture.    Patient is currently afebrile with no tachycardia.  She feels like she empties her bladder completely.  She feels like her urination has largely returned to normal.  Denies any nausea, vomiting, fevers, chills, shortness of breath, or chest pain.      Allergies:  Codeine  Morphine      Medications:    Current Facility-Administered Medications   Medication     acetaminophen (TYLENOL) tablet 1,000 mg     amLODIPine (NORVASC) tablet 10 mg     citalopram (celeXA) tablet 20 mg     diclofenac (VOLTAREN) 1 % topical gel 2 g     gabapentin (NEURONTIN) capsule 100 mg     hydrochlorothiazide (HYDRODIURIL) tablet 25 mg     Lidocaine (LIDOCARE) 4 % Patch 2 patch     lidocaine patch in PLACE     lisinopril (ZESTRIL) tablet 40 mg     melatonin tablet 1 mg     naloxone (NARCAN) injection 0.2 mg    Or     naloxone (NARCAN) injection 0.4 mg    Or     naloxone (NARCAN) injection 0.2 mg    Or     naloxone (NARCAN) injection 0.4 mg     ondansetron (ZOFRAN ODT) ODT tab 4 mg    Or     ondansetron (ZOFRAN) injection 4 mg     pantoprazole (PROTONIX) EC tablet 40 mg     potassium chloride ER (K-TAB/KLOR-CON) CR tablet 10 mEq     traMADol (ULTRAM) half-tab 25 mg        Past Medical History:    Chronic low back pain  Hypertension  Osteoporosis   Gastritis  Duodenal ulceration  Anxiety  Hiatal hernia  Cataracts  PVD  Anterior corneal dystrophies  Exudative senile macular degeneration  "of retina (HC)  Nonexudative senile macular degeneration of retina  Retinal hemorrhage  Hyperparathyroidism   Anemia  Osteoarthritis   Anterior tibial tendonitis  TIA   Stroke  Macular degeneration  GERD      Past Surgical History:    NV correct malrotation of bowel  Spinal fusion  Knee replace  Cataract extraction   SHX lens implant  Hip surgery      Family History:    Father: macular darlyn  Mother: hypertension, osteoporosis      Social History:  Patient arrived via private vehicle with daughter-in-law to the ED.           Review of Systems:   A comprehensive 10-point review of systems was performed and found to be negative except as described in the HPI.     /81 (BP Location: Right arm)   Pulse 72   Temp 98.1  F (36.7  C) (Oral)   Resp 16   Ht 1.473 m (4' 10\")   Wt 53.2 kg (117 lb 3.2 oz)   LMP  (LMP Unknown)   SpO2 95%   BMI 24.49 kg/m    PSYCH: NAD  EYES: EOMI  MOUTH: MMM  NECK: Supple, no notable adenopathy  RESP: Unlabored breathing  CARDIAC: No LE edema, regular radial pulse  SKIN: Warm, no rashes  ABD: soft, Nontender  NEURO: AAO x3  URO: Urinating on own.         Data:     Lab Results   Component Value Date    WBC 7.6 03/27/2023    HGB 12.7 03/27/2023    HCT 38.0 03/27/2023    MCV 99 03/27/2023     03/27/2023     Lab Results   Component Value Date    CR 0.64 03/27/2023    CR 0.58 03/26/2023     Recent Labs   Lab 03/25/23  1510   COLOR Light Yellow   APPEARANCE Clear   URINEGLC Negative   URINEBILI Negative   URINEKETONE Negative   SG 1.009   URINEPH 7.0   PROTEIN Negative   NITRITE Negative   LEUKEST Negative       US Abdomen Limited    Result Date: 3/27/2023  US ABDOMEN LIMITED   3/27/2023 9:20 AM HISTORY:  Right upper quadrant pain and gallbladder stones on CT, as above. COMPARISON: None available. FINDINGS:  Gallbladder: Hypoechoic shadowing area in the gallbladder, could represent gallstone versus tumefactive sludge. No gallbladder wall thickening or pericholecystic fluid. " Sonographic Cabrera's sign is negative. Bile ducts:  CHD is normal diameter.  No intrahepatic biliary dilatation. The distal aspect of the common bile duct is obscured by overlying bowel gas. Liver: It demonstrates normal parenchymal echogenicity. There is approximately 2.3 x 2.6 x 1.4 cm mildly complex cystic area in the left hepatic lobe. Pancreas:  Partially obscured by overlying bowel gas,  but grossly unremarkable. Right kidney:  No hydronephrosis or shadowing calculi. Multiple anechoic cysts, the largest 2 measures 5.5 x 4.5 x 5.5 cm at the interpolar region and 7.8 x 6.1 x 5.5 cm at the lower pole. Aorta and IVC:  Not specifically assessed.     IMPRESSION:  1. Hypoechoic shadowing area in the gallbladder, could represent gallstone versus tumefactive sludge. No sonographic evidence for acute cholecystitis. 2. 2.6 cm mildly complex left hepatic cyst. 3. Multiple right renal cysts measure up to 7.8 cm at the lower pole. KRISTYN REYES MD   SYSTEM ID:  [id number]    MR Lumbar Spine w/o & w Contrast    Result Date: 3/25/2023  EXAM: MR THORACIC SPINE W/O and W CONTRAST, MR LUMBAR SPINE W/O and W CONTRAST LOCATION: [facility] DATE/TIME: 3/25/2023 9:28 PM INDICATION: Worsening back pain with urinary incontinence COMPARISON:  CT abdomen pelvis 11/02/2009, CT chest 03/25/2023. CONTRAST: 5 mL Gadavist TECHNIQUE: 1) Routine Thoracic Spine MRI without and with IV contrast. 2) Routine Lumbar Spine MRI without and with IV contrast. FINDINGS: THORACIC SPINE: Thoracolumbar scoliosis. Multilevel degenerative listheses in the thoracic spine with mid/lower thoracic kyphosis. Slight endplate irregularities, height loss (less than 50%), enhancement, and prevertebral edema at T9 suggestive of a recent fracture. Few  levels of degenerative endplate edema also demonstrates sclerotic changes on recent CT. Chronic T2 and T3 superior endplate deformities with mild height loss. Remaining vertebral body heights are  unremarkable. Multilevel degenerative changes without high-grade canal stenosis. Small pleural effusions. The large hiatal hernia is better assessed on recent CT. LUMBAR SPINE: Transitional lumbosacral anatomy with sacralization of L5. Incompletely formed disc space at L5-S1. Mildly heterogeneous marrow signal. Lumbar levocurvature. Few levels of degenerative listhesis. No suspicious marrow signal. Multilevel degenerative changes without high-grade canal stenosis. Severe right L1-L2 and bilateral L2-L3 foraminal stenosis. Additional areas of mild foraminal stenosis. The conus terminates at L2-L3. Dorsal paraspinal and bilateral iliacus muscle atrophy. Intra-abdominal findings are better assessed on recent CT.     IMPRESSION: THORACIC SPINE MRI: 1.  Recent T9 compression fracture with less than 50% vertebral body height loss. Associated enhancement and prevertebral edema. No traumatic subluxation. 2.  Multilevel degenerative changes without high-grade canal stenosis or abnormal cord signal. LUMBAR SPINE MRI: 1.  Transitional lumbosacral anatomy with sacralization of L5. 2.  Multilevel degenerative changes without high-grade canal stenosis. Foraminal stenosis, as detailed above.    MR Thoracic Spine w/o & w Contrast    Result Date: 3/25/2023  EXAM: MR THORACIC SPINE W/O and W CONTRAST, MR LUMBAR SPINE W/O and W CONTRAST LOCATION: [facility] DATE/TIME: 3/25/2023 9:28 PM INDICATION: Worsening back pain with urinary incontinence COMPARISON:  CT abdomen pelvis 11/02/2009, CT chest 03/25/2023. CONTRAST: 5 mL Gadavist TECHNIQUE: 1) Routine Thoracic Spine MRI without and with IV contrast. 2) Routine Lumbar Spine MRI without and with IV contrast. FINDINGS: THORACIC SPINE: Thoracolumbar scoliosis. Multilevel degenerative listheses in the thoracic spine with mid/lower thoracic kyphosis. Slight endplate irregularities, height loss (less than 50%), enhancement, and prevertebral edema at T9 suggestive of a  recent fracture. Few  levels of degenerative endplate edema also demonstrates sclerotic changes on recent CT. Chronic T2 and T3 superior endplate deformities with mild height loss. Remaining vertebral body heights are unremarkable. Multilevel degenerative changes without high-grade canal stenosis. Small pleural effusions. The large hiatal hernia is better assessed on recent CT. LUMBAR SPINE: Transitional lumbosacral anatomy with sacralization of L5. Incompletely formed disc space at L5-S1. Mildly heterogeneous marrow signal. Lumbar levocurvature. Few levels of degenerative listhesis. No suspicious marrow signal. Multilevel degenerative changes without high-grade canal stenosis. Severe right L1-L2 and bilateral L2-L3 foraminal stenosis. Additional areas of mild foraminal stenosis. The conus terminates at L2-L3. Dorsal paraspinal and bilateral iliacus muscle atrophy. Intra-abdominal findings are better assessed on recent CT.     IMPRESSION: THORACIC SPINE MRI: 1.  Recent T9 compression fracture with less than 50% vertebral body height loss. Associated enhancement and prevertebral edema. No traumatic subluxation. 2.  Multilevel degenerative changes without high-grade canal stenosis or abnormal cord signal. LUMBAR SPINE MRI: 1.  Transitional lumbosacral anatomy with sacralization of L5. 2.  Multilevel degenerative changes without high-grade canal stenosis. Foraminal stenosis, as detailed above.    CT Chest (PE) Abdomen Pelvis w Contrast    Result Date: 3/25/2023  EXAM: CT CHEST PE ABDOMEN PELVIS W CONTRAST LOCATION: [facility] DATE/TIME: 3/25/2023 4:23 PM INDICATION: Chest and abdominal pain. COMPARISON: CT abdomen and pelvis 11/02/2009 TECHNIQUE: CT chest pulmonary angiogram and routine CT abdomen pelvis with IV contrast. Arterial phase through the chest and venous phase through the abdomen and pelvis. Multiplanar reformats and MIP reconstructions were performed. Dose reduction techniques were  used. CONTRAST: 51mL Isovue 370 FINDINGS: ANGIOGRAM CHEST: Mildly dilated central pulmonary arteries suggesting pulmonary hypertension. Heterogeneous opacification of several subsegmental branches within the bilateral lower lobes secondary to turbulent flow. No pulmonary emboli are visualized. Calcified atherosclerotic changes of the thoracic aorta. Per protocol there is no luminal enhancement. No thoracic aortic aneurysm.  LUNGS AND PLEURA: Small bilateral pleural effusions. The right pleural effusion appears loculated. Mild bilateral apical fibrotic changes. Mild, smooth interlobular septal thickening within the bilateral lower upper lobes. This is a nonspecific finding but may be seen with early interstitial pulmonary edema. Approximately 1.7 x 0.8 cm masslike area of peripheral airspace opacity within the anterior aspect of the right middle lobe. This has dendritic extensions which suggested this may represent rounded  atelectasis. MEDIASTINUM/AXILLAE: Mildly enlarged hilar and right paratracheal lymph nodes. Large hiatal hernia protrusion of the majority of the stomach into the mediastinum. Marked cardiomegaly. CORONARY ARTERY CALCIFICATION: Moderate. HEPATOBILIARY: 2.6 cm lobulated benign cyst arising from the superior aspect of the left hepatic lobe. 2.3 cm gallstone. PANCREAS: Normal. SPLEEN: Normal. ADRENAL GLANDS: Stable 2.9 cm right adrenal nodule representing a benign adenoma. Negative left adrenal. KIDNEYS/BLADDER: Multiple benign-appearing bilateral renal cysts. The dominant cyst, which arising from the lateral aspect of the right kidney measures 5.5 x 5.5 cm in diameter. These have a benign appearance and no specific follow-up is necessary. No hydronephrosis. BOWEL: Normal caliber loops of small bowel. Duodenal diverticulum. Normal caliber colon. Diverticulosis of the descending and sigmoid portions of the colon. No CT evidence for diverticulitis. LYMPH NODES: Normal. VASCULATURE: Moderate calcified  atherosclerotic changes. No abdominal aortic aneurysm. PELVIC ORGANS: Coarse calcifications within a mildly prominent uterus likely within uterine fibroids. MUSCULOSKELETAL: Chronic fractures of the posterior and posterolateral access of the right ninth and 10th ribs. Degenerative changes visualized spine with increased thoracic kyphosis. Degenerative changes bilateral hips. Partially visualized postoperative changes of a right femoral intramedullary nail with dynamic hip screw fixation. Degenerative changes bilateral shoulders.     IMPRESSION: 1.  No pulmonary embolism. Dilated central pulmonary arteries suggesting pulmonary hypertension. 2.  Small bilateral pleural effusions. Right pleural effusion appears loculated. 3.  1.7 cm masslike area of airspace changes within the right middle lobe. This has an appearance suggestive of rounded atelectasis. However recommend follow-up chest CT to ascertain resolution. 4.   Nonspecific mild mediastinal and right hilar adenopathy. 5.  Large hiatal hernia. 6.  Cholelithiasis

## 2023-03-27 NOTE — PLAN OF CARE
PRIMARY DIAGNOSIS: Thoracic back pain, Gallstones, Hiatal hernia.  OUTPATIENT/OBSERVATION GOALS TO BE MET BEFORE DISCHARGE:  1. Pain Status: Improved-controlled with oral pain medications.    2. Return to near baseline physical activity: Yes    3. Cleared for discharge by consultants (if involved): No    Discharge Planner Nurse   Safe discharge environment identified: Yes  Barriers to discharge: Yes       Entered by: Jose G Oliver RN 03/26/2023     Please review provider order for any additional goals.   Nurse to notify provider when observation goals have been met and patient is ready for discharge.

## 2023-03-28 ENCOUNTER — APPOINTMENT (OUTPATIENT)
Dept: GENERAL RADIOLOGY | Facility: CLINIC | Age: 88
End: 2023-03-28
Attending: STUDENT IN AN ORGANIZED HEALTH CARE EDUCATION/TRAINING PROGRAM
Payer: MEDICARE

## 2023-03-28 PROCEDURE — G0378 HOSPITAL OBSERVATION PER HR: HCPCS

## 2023-03-28 PROCEDURE — 71046 X-RAY EXAM CHEST 2 VIEWS: CPT

## 2023-03-28 PROCEDURE — 250N000013 HC RX MED GY IP 250 OP 250 PS 637: Performed by: HOSPITALIST

## 2023-03-28 PROCEDURE — 99232 SBSQ HOSP IP/OBS MODERATE 35: CPT | Performed by: STUDENT IN AN ORGANIZED HEALTH CARE EDUCATION/TRAINING PROGRAM

## 2023-03-28 PROCEDURE — 99231 SBSQ HOSP IP/OBS SF/LOW 25: CPT | Performed by: PHYSICIAN ASSISTANT

## 2023-03-28 PROCEDURE — 250N000013 HC RX MED GY IP 250 OP 250 PS 637: Performed by: INTERNAL MEDICINE

## 2023-03-28 RX ADMIN — CITALOPRAM HYDROBROMIDE 20 MG: 20 TABLET ORAL at 10:00

## 2023-03-28 RX ADMIN — PANTOPRAZOLE SODIUM 40 MG: 40 TABLET, DELAYED RELEASE ORAL at 10:00

## 2023-03-28 RX ADMIN — GABAPENTIN 100 MG: 100 CAPSULE ORAL at 21:25

## 2023-03-28 RX ADMIN — TRAMADOL HYDROCHLORIDE 25 MG: 50 TABLET ORAL at 09:58

## 2023-03-28 RX ADMIN — AMLODIPINE BESYLATE 10 MG: 10 TABLET ORAL at 21:25

## 2023-03-28 RX ADMIN — LIDOCAINE PATCH 4% 2 PATCH: 40 PATCH TOPICAL at 10:00

## 2023-03-28 RX ADMIN — LISINOPRIL 40 MG: 40 TABLET ORAL at 10:00

## 2023-03-28 RX ADMIN — ACETAMINOPHEN 1000 MG: 500 TABLET ORAL at 04:49

## 2023-03-28 RX ADMIN — HYDROCHLOROTHIAZIDE 25 MG: 25 TABLET ORAL at 09:58

## 2023-03-28 RX ADMIN — ACETAMINOPHEN 1000 MG: 500 TABLET ORAL at 12:54

## 2023-03-28 RX ADMIN — ACETAMINOPHEN 1000 MG: 500 TABLET ORAL at 21:24

## 2023-03-28 RX ADMIN — POTASSIUM CHLORIDE 10 MEQ: 750 TABLET, FILM COATED, EXTENDED RELEASE ORAL at 21:25

## 2023-03-28 RX ADMIN — GABAPENTIN 100 MG: 100 CAPSULE ORAL at 09:59

## 2023-03-28 RX ADMIN — PANTOPRAZOLE SODIUM 40 MG: 40 TABLET, DELAYED RELEASE ORAL at 21:24

## 2023-03-28 RX ADMIN — POTASSIUM CHLORIDE 10 MEQ: 750 TABLET, FILM COATED, EXTENDED RELEASE ORAL at 09:59

## 2023-03-28 RX ADMIN — TRAMADOL HYDROCHLORIDE 25 MG: 50 TABLET ORAL at 16:10

## 2023-03-28 RX ADMIN — DICLOFENAC SODIUM 2 G: 10 GEL TOPICAL at 10:05

## 2023-03-28 RX ADMIN — DICLOFENAC SODIUM 2 G: 10 GEL TOPICAL at 16:11

## 2023-03-28 RX ADMIN — DICLOFENAC SODIUM 2 G: 10 GEL TOPICAL at 12:55

## 2023-03-28 RX ADMIN — DICLOFENAC SODIUM 2 G: 10 GEL TOPICAL at 21:27

## 2023-03-28 ASSESSMENT — ACTIVITIES OF DAILY LIVING (ADL)
ADLS_ACUITY_SCORE: 31

## 2023-03-28 NOTE — PLAN OF CARE
PRIMARY DIAGNOSIS: Hiatal hernia, thoracic fracture  OUTPATIENT/OBSERVATION GOALS TO BE MET BEFORE DISCHARGE:  1. ADLs back to baseline: Yes    2. Activity and level of assistance: Up with standby assistance.    3. Pain status: Improved-controlled with oral pain medications.    4. Return to near baseline physical activity: Yes     Discharge Planner Nurse   Safe discharge environment identified: Yes  Barriers to discharge: No       Entered by: Milly Beltre RN 03/28/2023     Pt. A/O x4. VSS. Pain 8/10 in mid back and R rib area under breast. PRN tramadol and scheduled tylenol given. Lidocaine patches on back and Voltaren gel applied. Xrays ordered and pending. Pt. Ambulating via own walker with SB assist. Voiding adequately. No incontinent episodes this shift. Tolerating regular diet. Plan to discharge home later today or tomorrow. Will continue to provide supportive cares.     Please review provider order for any additional goals.   Nurse to notify provider when observation goals have been met and patient is ready for discharge.

## 2023-03-28 NOTE — PLAN OF CARE
PRIMARY DIAGNOSIS: Hiatal hernia, thoracic fracture   OUTPATIENT/OBSERVATION GOALS TO BE MET BEFORE DISCHARGE:  1. ADLs back to baseline: Yes    2. Activity and level of assistance: Up with standby assistance.    3. Pain status: Improved-controlled with oral pain medications.    4. Return to near baseline physical activity: Yes     Discharge Planner Nurse   Safe discharge environment identified: Yes  Barriers to discharge: Yes, pain increased in R rib area. Xray ordered.       Entered by: Milly Beltre RN 03/28/2023        Please review provider order for any additional goals.   Nurse to notify provider when observation goals have been met and patient is ready for discharge.

## 2023-03-28 NOTE — UTILIZATION REVIEW
"  Continued stay status; Secondary Review Determination     Admission Date: 3/25/2023  2:12 PM      Under the authority of the Utilization Management Committee, the utilization review process indicated a secondary review on the above patient.  The review outcome is based on review of the medical records, discussions with staff, and applying clinical experience noted on the date of the review.        ()      Inpatient Status Appropriate - This patient's medical care is consistent with medical management for inpatient care and reasonable inpatient medical practice.      (x) Observation Status Appropriate - This patient does not meet hospital inpatient criteria and is placed in observation status. If this patient's primary payer is Medicare and was admitted as an inpatient, Condition Code 44 should be used and patient status changed to \"observation\".   () Admission Status NOT Appropriate - This patient's medical care is not consistent with medical management for Inpatient or Observation Status.          RATIONALE FOR DETERMINATION   Jackie Gauthier is a 89 year old female with a history of htn, large hiatal hernia on PPI, chronic episodic epigastric pain, chronic low back pain.  She presented to the emergency room on 3/25 with acute worsening of chronic back pain.  Found to have recent T9 compression fracture.  Placed in the hospital for pain management.  Pain is under fairly good control at this point with oral and topical medications only.  She should be able to discharge from the hospital soon.  She was placed in the hospital on observation status.  As her pain is under good control with oral and topical medications only at this point, observation status at the hospital remains appropriate hospital status at this time.      The severity of illness, intensity of service provided, expected LOS and risk for adverse outcome make the care appropriate for observation level care at the hospital.        The information " on this document is developed by the utilization review team in order for the business office to ensure compliance.  This only denotes the appropriateness of proper admission status and does not reflect the quality of care rendered.         The definitions of Inpatient Status and Observation Status used in making the determination above are those provided in the CMS Coverage Manual, Chapter 1 and Chapter 6, section 70.4.      Sincerely,     Iván Perez D.O.  Utilization Review/ Case Management  [facility].

## 2023-03-28 NOTE — PROGRESS NOTES
"PRIMARY DIAGNOSIS: ACUTE PAIN  OUTPATIENT/OBSERVATION GOALS TO BE MET BEFORE DISCHARGE:  1. Pain Status: Improved-controlled with oral pain medications.    2. Return to near baseline physical activity: Yes    3. Cleared for discharge by consultants (if involved): Yes    Discharge Planner Nurse   Safe discharge environment identified: Yes  Barriers to discharge: No       Entered by: Will Alberts RN 03/28/2023 5:46 PM  /75 (BP Location: Right arm)   Pulse 77   Temp 98.3  F (36.8  C) (Oral)   Resp 18   Ht 1.473 m (4' 10\")   Wt 53.2 kg (117 lb 3.2 oz)   LMP  (LMP Unknown)   SpO2 97%   BMI 24.49 kg/m    VSS on RA. AxOx4 and able to make needs known. Ambulating SBA w/ wlkr, bed alarm on. Tolerating regular diet w/ poor appetite, encourging intake. PIV SL. Reporting 8/10 Right rib pain that radiate to mid back, scheduled Voltaren and PRN Tramadol administered (see MAR); mod relief. CXR completed, MD updated on results. No change to POC, will continue to provide supportive cares until safe discharge home.  Please review provider order for any additional goals.   Nurse to notify provider when observation goals have been met and patient is ready for discharge.  "

## 2023-03-28 NOTE — PROGRESS NOTES
[facility]  Hospitalist Progress Note    DOS: 03/28/2023    Reason for Stay (Diagnosis): back pain, transient loss of bladder control, ruq abdominal pain with cholelithiasis         Assessment and Plan:      Summary of Stay: Jackie Gauthier is a 89 year old female with a history of htn, large hiatal hernia on PPI, chronic episodic epigastric pain, chronic low back pain admitted on 3/25/2023 with mid thoracic back pain with radiation around to the front, very tender ruq pain of unclear etiology and duration, and 2 episodes of spontaneous urinary incontinence    On the night prior to presentation while watching tv she noticed that she was having some discomfort in her back. Would radiate up her whole back.  No sob/cp but does report intermittent pain in the solar plexus which resolves when she massages it.  She went to bed and her pain was even worse the next morning. Worsens with movement, some radiation around her belly chest area.  She denies any antecedent falls, twisting injuries.      Then she had 2 episodes where she just lost control of her bladder.  One time was in bed, she didn't even know she was peeing.  Then had another episode when walking when she just started to pee on the floor    ED evaluation:  VSS and labs unremarkable.  UA negative  CT CAP PE:  No PE, large hiatal hernia, RML rounded atelectasis which should get follow-up to ensure not something else, cholelithiasis    MRI thoracic and lumbar spine with recent T9 compression fracture  Her hospitalization is also notable for pretty servere ruq/lower rib pain.  Given the cholelithiasis seen on CT I ordered US which was negative for GB issue.  I reviewed her CT with radiologist who do not see any lower rib fx or other etiology for the pain.  Her skin is normal-no e/o shingles.  Temporally in coincided with the back pain but doesn't sound like nerve pain-more achy than sharp shooting/burning    She is actually moving quite well and  should be able to discharge back to her condo.  She'd like her various pains under better control but is aiming for tomorrow      Problem List:   Back pain  Thoracic compression fx-recent  I suspect her back pain is related to spontaneous compression fx  Pain control as follows     apap 1 gm tid     lidoderm patch on q am off q pm     Gabapentin 100 mg bid      Tramadol 25 mg q 6 hours prn          If unsuccessful with above would trial calcitonin   Overall back pain is under good control   PT to evaluate ambulatory status and she's doing well - I witnessed her ambulating in the [place] with a walker without any difficulties     Cholelithiasis with exquisite ruq pain  lfts wnl on admission.  Her  Pain was quite surprising to me, and she was not tender on the left side   -US negative for GB issues, skin exam is normal.  , I reviewed her CT scan with the radiologist and no findings to account for her pain.  Has chronic posterior rib fractures.  And has ? Anterior 4th rib fracture-I pushed over this rib and no pain at all so not clear what her pain is from.  She expressed that she wanted to know what this pain is from.  I told her that sometimes we just don't know.  I do suspect it's MSK in origin and that's why we can't find anything in her imaging.  She does state that heating pad helps   -trial voltaren gel     Lower Chest wall pain - Right  Assessment: significant pain and reproducible on exam. Possible rib injury??  Plan:  - Obtain CXR    Transient urinary incontinence  No e/o cauda equina on MRI  -appreciate urology input -> If recurrent urinary incontinence and the type is not perfectly clear, would consider formal urodynamic study as outpatient.  -Continue to work on back pain and bowel movements.    Incidental rounded atelectasis seen on admission CT or that's what it appears to be, recommendations are for OP f/u to ensure stability   -consider repeat CT in 4-6 weeks, I neglected to discuss this with her and her  "son today     htn on hydrochlorothiazide 25 mg every day, lisinopril 40 mg every day, amlodipine every day  and KCl 10 meq bid  -all resumed and BPs under fair control     Chronic LBP  Typically just on apap at home    Depression   Resumed pta citalopram     Large hiatal hernia  CT notes that almost her entire stomach is in her chest.  She's on PPI regularly   No sob which is good, no great therapies out there, cont with PPI      Covid   S/p 4 shot pfizer series 11/2022 (biv)    DVT Prophylaxis: Pneumatic Compression Devices  Code Status: DNR / DNI  Functional Status: lives alone in a condo, uses a walker in the morning but then is pretty independent the rest of the day, uses a cane for activities outside her condo  Diet: reg texture  Xie: not needed  Access PIV    Disposition Plan      Expected discharge tomorrow to home once chest pain under control.       Entered: Raj Horton MD 03/28/2023, 2:58 PM       Securely message with [employer] (more info)  Text page via [employer] Paging/Directory     Discussed with patient and son at the bedside    I spent 45 minutes reviewing epic including prior labs/imaging/medical history and notes related to this encounter  In addition time was spent in interveiwing the patient, communicating with contacts, and medical decision making      Interval History (Subjective):        Back pain is controlled  But reports that she has significant right lower chest wall up to 8/10  She feels unsafe to discharge given degree of pain  No significant SOB  No fevers  No nausea / vomiting         Physical Exam:      Last Vital Signs:  /69 (BP Location: Right arm)   Pulse 70   Temp 98.3  F (36.8  C) (Oral)   Resp 16   Ht 1.473 m (4' 10\")   Wt 53.2 kg (117 lb 3.2 oz)   LMP  (LMP Unknown)   SpO2 94%   BMI 24.49 kg/m      Constitutional: Elderly female, no acute distress.  Answers appropriately.  Respiratory: Nl WOB, Clear bilaterally, No wheezes, no crackles  Cardiovascular: Regular, " systolic murmur noted  GI: Bowel sounds present.  She does have some tenderness in the epigastric region but no rebound or guarding.  Lymph/Hematologic: No bruising. No cervical LAD  Skin: No rash  Musculoskeletal: There is rib tenderness present in lower right chest wall.   Neurologic: A&Ox3, Answers appropriately. Moves all extremities. No tremor  Psychiatric: Calm         Medications:      All current medications were reviewed with changes reflected in problem list.         Data:      All new lab and imaging data was reviewed.   Labs:  Recent Labs   Lab 03/27/23 0527      POTASSIUM 3.4   CHLORIDE 97*   CO2 28   ANIONGAP 11   *   BUN 12.4   CR 0.64   GFRESTIMATED 82   PAYTON 9.4     Recent Labs   Lab 03/27/23 0527   WBC 7.6   HGB 12.7   HCT 38.0   MCV 99        Recent Labs   Lab 03/27/23 0527 03/26/23 0525 03/25/23  1434   * 86 97     Recent Labs   Lab 03/27/23 0527 03/26/23 0525 03/25/23  1434   AST 26 26 32   ALT 19 18 23   ALKPHOS 72 69 76   BILITOTAL 0.7 0.7 0.7      Imaging:   Results for orders placed or performed during the hospital encounter of 03/25/23   CT Chest (PE) Abdomen Pelvis w Contrast    Narrative    EXAM: CT CHEST PE ABDOMEN PELVIS W CONTRAST  LOCATION: [facility]  DATE/TIME: 3/25/2023 4:23 PM    INDICATION: Chest and abdominal pain.  COMPARISON: CT abdomen and pelvis 11/02/2009  TECHNIQUE: CT chest pulmonary angiogram and routine CT abdomen pelvis with IV contrast. Arterial phase through the chest and venous phase through the abdomen and pelvis. Multiplanar reformats and MIP reconstructions were performed. Dose reduction   techniques were used.   CONTRAST: 51mL Isovue 370    FINDINGS:  ANGIOGRAM CHEST: Mildly dilated central pulmonary arteries suggesting pulmonary hypertension. Heterogeneous opacification of several subsegmental branches within the bilateral lower lobes secondary to turbulent flow. No pulmonary emboli are visualized.    Calcified atherosclerotic changes of the thoracic aorta. Per protocol there is no luminal enhancement. No thoracic aortic aneurysm.      LUNGS AND PLEURA: Small bilateral pleural effusions. The right pleural effusion appears loculated. Mild bilateral apical fibrotic changes. Mild, smooth interlobular septal thickening within the bilateral lower upper lobes. This is a nonspecific finding   but may be seen with early interstitial pulmonary edema. Approximately 1.7 x 0.8 cm masslike area of peripheral airspace opacity within the anterior aspect of the right middle lobe. This has dendritic extensions which suggested this may represent rounded   atelectasis.    MEDIASTINUM/AXILLAE: Mildly enlarged hilar and right paratracheal lymph nodes. Large hiatal hernia protrusion of the majority of the stomach into the mediastinum. Marked cardiomegaly.    CORONARY ARTERY CALCIFICATION: Moderate.    HEPATOBILIARY: 2.6 cm lobulated benign cyst arising from the superior aspect of the left hepatic lobe. 2.3 cm gallstone.    PANCREAS: Normal.    SPLEEN: Normal.    ADRENAL GLANDS: Stable 2.9 cm right adrenal nodule representing a benign adenoma. Negative left adrenal.    KIDNEYS/BLADDER: Multiple benign-appearing bilateral renal cysts. The dominant cyst, which arising from the lateral aspect of the right kidney measures 5.5 x 5.5 cm in diameter. These have a benign appearance and no specific follow-up is necessary. No   hydronephrosis.    BOWEL: Normal caliber loops of small bowel. Duodenal diverticulum. Normal caliber colon. Diverticulosis of the descending and sigmoid portions of the colon. No CT evidence for diverticulitis.    LYMPH NODES: Normal.    VASCULATURE: Moderate calcified atherosclerotic changes. No abdominal aortic aneurysm.    PELVIC ORGANS: Coarse calcifications within a mildly prominent uterus likely within uterine fibroids.    MUSCULOSKELETAL: Chronic fractures of the posterior and posterolateral access of the right  ninth and 10th ribs. Degenerative changes visualized spine with increased thoracic kyphosis. Degenerative changes bilateral hips. Partially visualized   postoperative changes of a right femoral intramedullary nail with dynamic hip screw fixation. Degenerative changes bilateral shoulders.      Impression    IMPRESSION:  1.  No pulmonary embolism. Dilated central pulmonary arteries suggesting pulmonary hypertension.  2.  Small bilateral pleural effusions. Right pleural effusion appears loculated.  3.  1.7 cm masslike area of airspace changes within the right middle lobe. This has an appearance suggestive of rounded atelectasis. However recommend follow-up chest CT to ascertain resolution.  4.   Nonspecific mild mediastinal and right hilar adenopathy.  5.  Large hiatal hernia.  6.  Cholelithiasis   MR Thoracic Spine w/o & w Contrast    Narrative    EXAM: MR THORACIC SPINE W/O and W CONTRAST, MR LUMBAR SPINE W/O and W CONTRAST  LOCATION: [facility]  DATE/TIME: 3/25/2023 9:28 PM    INDICATION: Worsening back pain with urinary incontinence  COMPARISON:  CT abdomen pelvis 11/02/2009, CT chest 03/25/2023.  CONTRAST: 5 mL Gadavist  TECHNIQUE:   1) Routine Thoracic Spine MRI without and with IV contrast.  2) Routine Lumbar Spine MRI without and with IV contrast.    FINDINGS:  THORACIC SPINE:  Thoracolumbar scoliosis. Multilevel degenerative listheses in the thoracic spine with mid/lower thoracic kyphosis. Slight endplate irregularities, height loss (less than 50%), enhancement, and prevertebral edema at T9 suggestive of a recent fracture. Few   levels of degenerative endplate edema also demonstrates sclerotic changes on recent CT. Chronic T2 and T3 superior endplate deformities with mild height loss. Remaining vertebral body heights are unremarkable. Multilevel degenerative changes without   high-grade canal stenosis. Small pleural effusions. The large hiatal hernia is better assessed on recent  CT.    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 Incompletely formed disc space at L5-S1. Mildly heterogeneous marrow signal. Lumbar levocurvature. Few levels of degenerative listhesis. No suspicious marrow signal. Multilevel degenerative   changes without high-grade canal stenosis. Severe right L1-L2 and bilateral L2-L3 foraminal stenosis. Additional areas of mild foraminal stenosis. The conus terminates at L2-L3. Dorsal paraspinal and bilateral iliacus muscle atrophy. Intra-abdominal   findings are better assessed on recent CT.       Impression    IMPRESSION:    THORACIC SPINE MRI:  1.  Recent T9 compression fracture with less than 50% vertebral body height loss. Associated enhancement and prevertebral edema. No traumatic subluxation.  2.  Multilevel degenerative changes without high-grade canal stenosis or abnormal cord signal.    LUMBAR SPINE MRI:  1.  Transitional lumbosacral anatomy with sacralization of L5.  2.  Multilevel degenerative changes without high-grade canal stenosis. Foraminal stenosis, as detailed above.   MR Lumbar Spine w/o & w Contrast    Narrative    EXAM: MR THORACIC SPINE W/O and W CONTRAST, MR LUMBAR SPINE W/O and W CONTRAST  LOCATION: [facility]  DATE/TIME: 3/25/2023 9:28 PM    INDICATION: Worsening back pain with urinary incontinence  COMPARISON:  CT abdomen pelvis 11/02/2009, CT chest 03/25/2023.  CONTRAST: 5 mL Gadavist  TECHNIQUE:   1) Routine Thoracic Spine MRI without and with IV contrast.  2) Routine Lumbar Spine MRI without and with IV contrast.    FINDINGS:  THORACIC SPINE:  Thoracolumbar scoliosis. Multilevel degenerative listheses in the thoracic spine with mid/lower thoracic kyphosis. Slight endplate irregularities, height loss (less than 50%), enhancement, and prevertebral edema at T9 suggestive of a recent fracture. Few   levels of degenerative endplate edema also demonstrates sclerotic changes on recent CT. Chronic T2  and T3 superior endplate deformities with mild height loss. Remaining vertebral body heights are unremarkable. Multilevel degenerative changes without   high-grade canal stenosis. Small pleural effusions. The large hiatal hernia is better assessed on recent CT.    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 Incompletely formed disc space at L5-S1. Mildly heterogeneous marrow signal. Lumbar levocurvature. Few levels of degenerative listhesis. No suspicious marrow signal. Multilevel degenerative   changes without high-grade canal stenosis. Severe right L1-L2 and bilateral L2-L3 foraminal stenosis. Additional areas of mild foraminal stenosis. The conus terminates at L2-L3. Dorsal paraspinal and bilateral iliacus muscle atrophy. Intra-abdominal   findings are better assessed on recent CT.       Impression    IMPRESSION:    THORACIC SPINE MRI:  1.  Recent T9 compression fracture with less than 50% vertebral body height loss. Associated enhancement and prevertebral edema. No traumatic subluxation.  2.  Multilevel degenerative changes without high-grade canal stenosis or abnormal cord signal.    LUMBAR SPINE MRI:  1.  Transitional lumbosacral anatomy with sacralization of L5.  2.  Multilevel degenerative changes without high-grade canal stenosis. Foraminal stenosis, as detailed above.

## 2023-03-28 NOTE — PROVIDER NOTIFICATION
"5:32 PM  Provider: Sol  Message:CXR resulted \"Stable cardiomegaly and small bilateral pleural effusions.  Mild bibasilar atelectasis is stable. Stable large  hiatal hernia. No pneumothorax.\"  Response: Provide supportive cares until safe discharge tomorrow.  "

## 2023-03-28 NOTE — PROGRESS NOTES
[facility] Progress Note          Assessment and Plan:     Assessment:      Urinary incontinence      Back pain      History of stroke    Assessment:  -If recurrent urinary incontinence and the type is not perfectly clear, would consider formal urodynamic study.  -Continue to work on back pain and bowel movements.  -Urology contact information given if she needs follow up.  Follow up as needed.  -Will plan on signing off.    Helga Boogie PA-C   [facility]  [phone]               Interval History:     Urinating well.  Postvoid residual has been low.  No further episodes of urinary incontinence.  Still having right sided and right back pain.  Urinalysis within normal limits.  1 small bowel movement.  Patient is afebrile no tachycardia.  WBC 7.6 (6.7).  Hemoglobin 12.7              Review of Systems:     The 5 point Review of Systems is negative other than noted in the HPI             Medications:     Current Facility-Administered Medications Ordered in Epic   Medication Dose Route Frequency Last Rate Last Admin     acetaminophen (TYLENOL) tablet 1,000 mg  1,000 mg Oral Q8H   1,000 mg at 03/28/23 0449     amLODIPine (NORVASC) tablet 10 mg  10 mg Oral QPM   10 mg at 03/27/23 2042     citalopram (celeXA) tablet 20 mg  20 mg Oral Daily   20 mg at 03/28/23 1000     diclofenac (VOLTAREN) 1 % topical gel 2 g  2 g Topical 4x Daily   2 g at 03/28/23 1005     gabapentin (NEURONTIN) capsule 100 mg  100 mg Oral BID   100 mg at 03/28/23 0959     hydrochlorothiazide (HYDRODIURIL) tablet 25 mg  25 mg Oral Daily   25 mg at 03/28/23 0958     Lidocaine (LIDOCARE) 4 % Patch 2 patch  2 patch Transdermal Q24H   2 patch at 03/28/23 1000     lidocaine patch in PLACE   Transdermal Q8H SYED         lisinopril (ZESTRIL) tablet 40 mg  40 mg Oral Daily   40 mg at 03/28/23 1000     melatonin tablet 1 mg  1 mg Oral At Bedtime PRN         naloxone (NARCAN) injection 0.2 mg  0.2 mg Intravenous Q2 Min PRN         Or     naloxone (NARCAN) injection 0.4 mg  0.4 mg Intravenous Q2 Min PRN        Or     naloxone (NARCAN) injection 0.2 mg  0.2 mg Intramuscular Q2 Min PRN        Or     naloxone (NARCAN) injection 0.4 mg  0.4 mg Intramuscular Q2 Min PRN         ondansetron (ZOFRAN ODT) ODT tab 4 mg  4 mg Oral Q6H PRN        Or     ondansetron (ZOFRAN) injection 4 mg  4 mg Intravenous Q6H PRN         pantoprazole (PROTONIX) EC tablet 40 mg  40 mg Oral BID   40 mg at 03/28/23 1000     potassium chloride ER (K-TAB/KLOR-CON) CR tablet 10 mEq  10 mEq Oral BID   10 mEq at 03/28/23 0959     traMADol (ULTRAM) half-tab 25 mg  25 mg Oral Q6H PRN   25 mg at 03/28/23 0958     Current Outpatient Medications Ordered in Epic   Medication     docusate sodium (COLACE) 100 MG tablet     ondansetron (ZOFRAN ODT) 4 MG ODT tab     oxyCODONE (ROXICODONE) 5 MG tablet                  Physical Exam:   Vitals were reviewed  Patient Vitals for the past 8 hrs:   BP Temp Temp src Pulse Resp SpO2   03/28/23 0848 124/77 97.4  F (36.3  C) Oral 68 16 94 %   03/28/23 0410 127/73 97.9  F (36.6  C) Oral 70 16 92 %     GEN: NAD, lying in bed  EYES: EOMI  MOUTH: MMM  NECK: Supple  RESP: Unlabored breathing  NEURO: AAO  URO: Urinating on own without leakage.           Data:   No results found for: NTBNPI, NTBNP  Lab Results   Component Value Date    WBC 7.6 03/27/2023    WBC 6.7 03/26/2023    WBC 6.2 03/25/2023    HGB 12.7 03/27/2023    HGB 12.2 03/26/2023    HGB 12.8 03/25/2023    HCT 38.0 03/27/2023    HCT 37.0 03/26/2023    HCT 38.8 03/25/2023    MCV 99 03/27/2023     03/26/2023     03/25/2023     03/27/2023     03/26/2023     03/25/2023

## 2023-03-28 NOTE — PLAN OF CARE
"PRIMARY DIAGNOSIS: Thoracic back pain, Gallstones, Hiatal hernia.  OUTPATIENT/OBSERVATION GOALS TO BE MET BEFORE DISCHARGE:  1. Pain Status: Improved-controlled with oral pain medications.    2. Return to near baseline physical activity: Yes    3. Cleared for discharge by consultants (if involved): No    Discharge Planner Nurse   Safe discharge environment identified: Yes  Barriers to discharge: Yes   A & O X 4. Pleasant. Lung sound clear bilaterally to auscultation . Denies chest pain, shortness of breath, lightheadedness, nausea, or vomit. Bowel sound present all quadrants and active.  A X 1 with care . Wears hearing aid bilaterally. Peripheral IV saline lock. Pain is managed with Tylenol, Voltaren gel, lidocaine patch, prn tramadol. Ambulated to the bathroom and voided .  PVR 0 mL.Afebrile.Possible discharging home today.  /73 (BP Location: Right arm)   Pulse 70   Temp 97.9  F (36.6  C) (Oral)   Resp 16   Ht 1.473 m (4' 10\")   Wt 53.2 kg (117 lb 3.2 oz)   LMP  (LMP Unknown)   SpO2 92%   BMI 24.49 kg/m           Entered by: Jose G Oliver RN 03/28/2023 06:27     Please review provider order for any additional goals.   Nurse to notify provider when observation goals have been met and patient is ready for discharge.  "

## 2023-03-28 NOTE — PLAN OF CARE
PRIMARY DIAGNOSIS: Thoracic back pain, Gallstones, Hiatal hernia.  OUTPATIENT/OBSERVATION GOALS TO BE MET BEFORE DISCHARGE:  1. Pain Status: Improved-controlled with oral pain medications.    2. Return to near baseline physical activity: Yes    3. Cleared for discharge by consultants (if involved): No    Discharge Planner Nurse   Safe discharge environment identified: Yes  Barriers to discharge: Yes        Entered by: Jose G Oliver RN 03/27/2023     Please review provider order for any additional goals.   Nurse to notify provider when observation goals have been met and patient is ready for discharge.

## 2023-03-28 NOTE — PLAN OF CARE
PRIMARY DIAGNOSIS: Hiatal hernia, thoracic fracture   OUTPATIENT/OBSERVATION GOALS TO BE MET BEFORE DISCHARGE:  1. ADLs back to baseline: Yes    2. Activity and level of assistance: Up with standby assistance.    3. Pain status: Improved-controlled with oral pain medications.    4. Return to near baseline physical activity: Yes     Discharge Planner Nurse   Safe discharge environment identified: Yes  Barriers to discharge: Yes, pain increased in R rib area. Xray ordered and pending.       Entered by: Milly Beltre RN 03/28/2023         Please review provider order for any additional goals.   Nurse to notify provider when observation goals have been met and patient is ready for discharge.

## 2023-03-29 VITALS
TEMPERATURE: 98.1 F | OXYGEN SATURATION: 94 % | DIASTOLIC BLOOD PRESSURE: 66 MMHG | HEART RATE: 65 BPM | BODY MASS INDEX: 24.6 KG/M2 | RESPIRATION RATE: 16 BRPM | SYSTOLIC BLOOD PRESSURE: 116 MMHG | HEIGHT: 58 IN | WEIGHT: 117.2 LBS

## 2023-03-29 PROCEDURE — G0378 HOSPITAL OBSERVATION PER HR: HCPCS

## 2023-03-29 PROCEDURE — 250N000013 HC RX MED GY IP 250 OP 250 PS 637: Performed by: INTERNAL MEDICINE

## 2023-03-29 PROCEDURE — 99239 HOSP IP/OBS DSCHRG MGMT >30: CPT | Performed by: INTERNAL MEDICINE

## 2023-03-29 PROCEDURE — 250N000013 HC RX MED GY IP 250 OP 250 PS 637: Performed by: HOSPITALIST

## 2023-03-29 RX ORDER — GABAPENTIN 100 MG/1
100 CAPSULE ORAL 2 TIMES DAILY
Qty: 28 CAPSULE | Refills: 0 | Status: SHIPPED | OUTPATIENT
Start: 2023-03-29 | End: 2023-04-12

## 2023-03-29 RX ORDER — LIDOCAINE 4 G/G
2 PATCH TOPICAL EVERY 24 HOURS
Qty: 14 PATCH | Refills: 0 | Status: SHIPPED | OUTPATIENT
Start: 2023-03-29 | End: 2023-04-05

## 2023-03-29 RX ORDER — TRAMADOL HYDROCHLORIDE 50 MG/1
25 TABLET ORAL EVERY 6 HOURS PRN
Qty: 10 TABLET | Refills: 0 | Status: SHIPPED | OUTPATIENT
Start: 2023-03-29

## 2023-03-29 RX ADMIN — TRAMADOL HYDROCHLORIDE 25 MG: 50 TABLET ORAL at 00:09

## 2023-03-29 RX ADMIN — TRAMADOL HYDROCHLORIDE 25 MG: 50 TABLET ORAL at 08:50

## 2023-03-29 RX ADMIN — CITALOPRAM HYDROBROMIDE 20 MG: 20 TABLET ORAL at 08:50

## 2023-03-29 RX ADMIN — POTASSIUM CHLORIDE 10 MEQ: 750 TABLET, FILM COATED, EXTENDED RELEASE ORAL at 08:51

## 2023-03-29 RX ADMIN — DICLOFENAC SODIUM 2 G: 10 GEL TOPICAL at 08:52

## 2023-03-29 RX ADMIN — HYDROCHLOROTHIAZIDE 25 MG: 25 TABLET ORAL at 08:51

## 2023-03-29 RX ADMIN — LIDOCAINE PATCH 4% 2 PATCH: 40 PATCH TOPICAL at 08:50

## 2023-03-29 RX ADMIN — PANTOPRAZOLE SODIUM 40 MG: 40 TABLET, DELAYED RELEASE ORAL at 08:50

## 2023-03-29 RX ADMIN — LISINOPRIL 40 MG: 40 TABLET ORAL at 08:50

## 2023-03-29 RX ADMIN — GABAPENTIN 100 MG: 100 CAPSULE ORAL at 08:51

## 2023-03-29 ASSESSMENT — ACTIVITIES OF DAILY LIVING (ADL)
ADLS_ACUITY_SCORE: 34
ADLS_ACUITY_SCORE: 31
ADLS_ACUITY_SCORE: 32
ADLS_ACUITY_SCORE: 31

## 2023-03-29 NOTE — PLAN OF CARE
PRIMARY DIAGNOSIS: ACUTE PAIN  OUTPATIENT/OBSERVATION GOALS TO BE MET BEFORE DISCHARGE:  1. Pain Status: Improved-controlled with oral pain medications.    2. Return to near baseline physical activity: Yes    3. Cleared for discharge by consultants (if involved): Yes    Discharge Planner Nurse   Safe discharge environment identified: Yes  Barriers to discharge: No       Entered by: Faustina Wright RN 03/29/2023   A&O x4. VSS, RA. Up w/ SBA gbw. 4/10 pain managed w/ PRN tramadol. Pt sleeping between cares and is able to make needs known.   Please review provider order for any additional goals.   Nurse to notify provider when observation goals have been met and patient is ready for discharge.

## 2023-03-29 NOTE — CONSULTS
Care Management Initial Consult    General Information  Assessment completed with: Patient, Children,    Type of CM/SW Visit: Offer D/C Planning    Primary Care Provider verified and updated as needed: Yes   Readmission within the last 30 days: no previous admission in last 30 days      Reason for Consult: discharge planning    Communication Assessment  Patient's communication style: spoken language (English or Bilingual)    Hearing Difficulty or Deaf: no      Cognitive  Cognitive/Neuro/Behavioral: WDL              Living Environment:   People in home: alone     Current living Arrangements: condominium      Able to return to prior arrangements: yes     Family/Social Support:  Care provided by: self  Provides care for: no one  Marital Status:   Children          Description of Support System: Supportive, Involved    Support Assessment: Adequate family and caregiver support    Current Resources:   Patient receiving home care services: No  Community Resources: None  Equipment currently used at home: cane, straight, walker, rolling    Lifestyle & Psychosocial Needs:  Social Determinants of Health     Tobacco Use: Not on file   Alcohol Use: Not on file   Financial Resource Strain: Not on file   Food Insecurity: Not on file   Transportation Needs: Not on file   Physical Activity: Not on file   Stress: Not on file   Social Connections: Not on file   Intimate Partner Violence: Not on file   Depression: Not on file   Housing Stability: Not on file     Care Management Discharge Note    Discharge Date: 03/29/2023       Discharge Disposition: Home, Home Care    Discharge Services: None    Discharge DME: None    Discharge Transportation: family or friend will provide    Patient/family educated on Medicare website which has current facility and service quality ratings: yes    Education Provided on the Discharge Plan:  Yes   Persons Notified of Discharge Plans: Pt and son  Patient/Family in Agreement with the Plan:  yes    Additional Information:  CM consulted for discharge planning, met with pt and son at bedside to discuss. Pt lives alone in a condo and is independent at baseline. She uses a cane outside of the home and WW for ambulating to the bathroom at night and in the morning upon rising. Son helps with medication set-up and pt self administers. Son provides transportation and helps with grocery shopping.     Provider has ordered Home RN/PT at discharge, pt is agreeable and has no preference on agency. Referral sent to home care hub to review, awaiting final HC agency at this time. Bedside RN updated.     Update 1145: Pt was accepted by [facility] with start date of 4/1-4/2. AVS updated. Bedside RN aware.     Ashley Campoverde RN BSN   Inpatient Care Coordination  [facility]   Phone [phone]

## 2023-03-29 NOTE — PROGRESS NOTES
OBSERVATION patient END time: 1220    Patient's After Visit Summary was reviewed with patient and/or family.   Patient verbalized understanding of After Visit Summary, recommended follow up and was given an opportunity to ask questions.   Discharge medications sent home with patient/family: YES   Discharged with son

## 2023-03-29 NOTE — PROGRESS NOTES
"PRIMARY DIAGNOSIS: ACUTE PAIN  OUTPATIENT/OBSERVATION GOALS TO BE MET BEFORE DISCHARGE:  1. Pain Status: Pain Free    2. Return to near baseline physical activity: Yes    3. Cleared for discharge by consultants (if involved): Yes    Discharge Planner Nurse   Safe discharge environment identified: Yes  Barriers to discharge: No       Entered by: Will Alberts RN 03/28/2023 8:24 PM  /71 (BP Location: Right arm)   Pulse 76   Temp 99  F (37.2  C) (Oral)   Resp 18   Ht 1.473 m (4' 10\")   Wt 53.2 kg (117 lb 3.2 oz)   LMP  (LMP Unknown)   SpO2 94%   BMI 24.49 kg/m    VSS on RA. AxOx4 and able to make needs known. Ambulating SBA w/ wlkr, bed alarm on. Tolerating regular diet w/ poor appetite, encourging intake. PIV SL. Denies pain. Pt walked the halls SBA w/ wlkr, tolerated well. PVR bladder scanned for 19mL. Pt and son updated on XR results and POC, both are agreeable, will continue to provide supportive cares until safe discharge home tomorrow.  Please review provider order for any additional goals.   Nurse to notify provider when observation goals have been met and patient is ready for discharge.  "

## 2023-03-29 NOTE — DISCHARGE SUMMARY
[facility]  Discharge Summary  Name: Jackie Gauthier    MRN: [id number]  YOB: 1931    Age: 89 year old  Date of Discharge:  3/29/2023  Date of Admission: 3/25/2023  Primary Care Provider: Nya Sands  Discharge Physician:  Rick Heaton MD  Discharging Service:  Hospitalist      Hospital Course/Discharge Diagnoses:    Jackie Gauthier is a 89 year old female with a history of htn, large hiatal hernia on PPI, chronic episodic epigastric pain, chronic low back pain admitted on 3/25/2023 with mid thoracic back pain with radiation around to the front, very tender ruq pain of unclear etiology and duration, and 2 episodes of spontaneous urinary incontinence     On the night prior to presentation while watching tv she noticed that she was having some discomfort in her back. Would radiate up her whole back.  No sob/cp but does report intermittent pain in the solar plexus which resolves when she massages it.  She went to bed and her pain was even worse the next morning. Worsens with movement, some radiation around her belly chest area.  She denies any antecedent falls, twisting injuries.       Then she had 2 episodes where she just lost control of her bladder.  One time was in bed, she didn't even know she was peeing.  Then had another episode when walking when she just started to pee on the floor.     ED evaluation:  VSS and labs unremarkable.  UA negative  CT CAP PE:  No PE, large hiatal hernia, RML rounded atelectasis which should get follow-up to ensure not something else, cholelithiasis     MRI thoracic and lumbar spine with recent T9 compression fracture    Her hospitalization is also notable for pretty servere ruq/lower rib pain.  Given the cholelithiasis seen on CT we ordered US which was negative for GB issue.    My colleague also reviewed her CT with radiologist who did not see any lower rib fx or other etiology for the pain.  Her skin is normal-no e/o shingles.  Temporally  in coincided with the back pain but doesn't sound like nerve pain-more achy than sharp shooting/burning that could be referred neuropathic pain.     She is actually moving quite well and should be able to discharge back to her condo.     She will discharge with Voltaren gel, Lidoderm patches, low-dose gabapentin and 25 mg tabs of tramadol in a very limited supply.  Her family appears quite supportive and she is actually quite spry at 89 and very socially active.    We recommended outpatient follow-up with a repeat CT scan in 4 to 6 weeks.    She has not had recurrence of her incontinence issues here and was actually seen by urology.        Problem List:   Back pain  Thoracic compression fx-recent     apap, lidoderm patch on q am off q pm, Gabapentin 100 mg bid, Tramadol 25 mg q 6 hours prn        Overall back pain is under good control.  Referring for home PT as well.       Cholelithiasis with exquisite ruq pain: Suspect musculoskeletal versus referred neuropathic from her fracture.  Improving.  lfts wnl on admission.       Lower Chest wall pain - Right  Assessment: significant pain and reproducible on exam. Possible rib injury??  CT and x-ray negative for fracture.  Has small effusions but pain seems atypical for that.  --Follow-up with primary care.     Transient urinary incontinence  No e/o cauda equina on MRI  -appreciate urology input -> If recurrent urinary incontinence and the type is not perfectly clear, would consider formal urodynamic study as outpatient.  -Continue to work on back pain and bowel movements.     Incidental rounded atelectasis seen on admission CT or that's what it appears to be, recommendations are for OP f/u to ensure stability   -repeat CT in 4-6 weeks as an outpatient, she may arrange this through her primary care clinic.     htn on hydrochlorothiazide 25 mg every day, lisinopril 40 mg every day, amlodipine every day  and KCl 10 meq bid  -all resumed and BPs under fair control  "     Chronic LBP  Typically just on apap at home     Depression   Resumed pta citalopram      Large hiatal hernia  CT notes that almost her entire stomach is in her chest.  She's on PPI regularly   No sob which is good, no great therapies out there, cont with PPI     Covid   S/p 4 shot pfizer series 11/2022 (biv)        Discharge Disposition:  Discharged to home     Allergies:  Allergies   Allergen Reactions     Codeine Unknown and Other (See Comments)     \"Gets loopy\"  \"Feels sick and goofy\"       Morphine Dizziness        Discharge Medications:        Review of your medicines      UNREVIEWED medicines. Ask your doctor about these medicines      Dose / Directions   ondansetron 4 MG ODT tab  Commonly known as: ZOFRAN ODT  Ask about: Should I take this medication?      Dose: 4 mg  Take 1 tablet (4 mg) by mouth every 8 hours as needed for nausea  Quantity: 10 tablet  Refills: 0        START taking      Dose / Directions   diclofenac 1 % topical gel  Commonly known as: VOLTAREN  Used for: Compression fracture of T9 vertebra, initial encounter (H)      Dose: 2 g  Apply 2 g topically 4 times daily  Quantity: 100 g  Refills: 0     docusate sodium 100 MG tablet  Commonly known as: COLACE      Dose: 100 mg  Take 1 tablet (100 mg) by mouth daily  Quantity: 10 tablet  Refills: 0     gabapentin 100 MG capsule  Commonly known as: NEURONTIN  Used for: Compression fracture of T9 vertebra, initial encounter (H)      Dose: 100 mg  Take 1 capsule (100 mg) by mouth 2 times daily for 14 days  Quantity: 28 capsule  Refills: 0     Lidocaine 4 % Patch  Commonly known as: LIDOCARE  Used for: Compression fracture of T9 vertebra, initial encounter (H)      Dose: 2 patch  Place 2 patches onto the skin every 24 hours for 7 days To prevent lidocaine toxicity, patient should be patch free for 12 hrs daily.  Quantity: 14 patch  Refills: 0     oxyCODONE 5 MG tablet  Commonly known as: ROXICODONE      Dose: 5 mg  Take 1 tablet (5 mg) by mouth " every 6 hours as needed for severe pain (7-10)  Quantity: 6 tablet  Refills: 0     traMADol 50 MG tablet  Commonly known as: ULTRAM  Used for: Compression fracture of T9 vertebra, initial encounter (H)      Dose: 25 mg  Take 0.5 tablets (25 mg) by mouth every 6 hours as needed for moderate pain (4-6)  Quantity: 10 tablet  Refills: 0        CONTINUE these medicines which have NOT CHANGED      Dose / Directions   amLODIPine 10 MG tablet  Commonly known as: NORVASC      Dose: 10 mg  Take 10 mg by mouth every evening  Refills: 0     calcium carbonate 500 MG tablet  Commonly known as: OS-PAYTON      Dose: 1 tablet  Take 1 tablet by mouth daily at 2 pm  Refills: 0     citalopram 20 MG tablet  Commonly known as: celeXA      Dose: 20 mg  Take 20 mg by mouth daily  Refills: 0     ferrous sulfate 325 (65 Fe) MG tablet  Commonly known as: FEROSUL      Dose: 325 mg  Take 325 mg by mouth every evening  Refills: 0     hydrochlorothiazide 25 MG tablet  Commonly known as: HYDRODIURIL      Dose: 25 mg  Take 25 mg by mouth daily  Refills: 0     lisinopril 40 MG tablet  Commonly known as: ZESTRIL      Dose: 40 mg  Take 40 mg by mouth daily  Refills: 0     omeprazole 20 MG DR capsule  Commonly known as: priLOSEC      Dose: 20 mg  Take 20 mg by mouth 2 times daily May take a third capsule if needed  Refills: 0     potassium chloride ER 10 MEQ CR capsule  Commonly known as: MICRO-K      Dose: 10 mEq  Take 10 mEq by mouth 2 times daily  Refills: 0     PreserVision AREDS Tabs      Dose: 1 tablet  Take 1 tablet by mouth 2 times daily  Refills: 0     Tab-A-Deng Tabs      Dose: 1 tablet  Take 1 tablet by mouth every evening  Refills: 0     * TYLENOL 500 MG tablet  Generic drug: acetaminophen      Dose: 1,000 mg  Take 1,000 mg by mouth At Bedtime  Refills: 0     * acetaminophen 500 MG tablet  Commonly known as: TYLENOL      Dose: 500-1,000 mg  Take 500-1,000 mg by mouth daily as needed for mild pain  Refills: 0     Vitamin D (Cholecalciferol) 25  "MCG (1000 UT) Caps      Dose: 25 mcg  Take 25 mcg by mouth daily at 2 pm  Refills: 0         * This list has 2 medication(s) that are the same as other medications prescribed for you. Read the directions carefully, and ask your doctor or other care provider to review them with you.               Where to get your medicines      These medications were sent to [place], [place] - [address] [facility]  [address] [facility] [address]    Phone: [phone]     diclofenac 1 % topical gel    gabapentin 100 MG capsule    Lidocaine 4 % Patch     Some of these will need a paper prescription and others can be bought over the counter. Ask your nurse if you have questions.    Bring a paper prescription for each of these medications    docusate sodium 100 MG tablet    ondansetron 4 MG ODT tab    oxyCODONE 5 MG tablet    traMADol 50 MG tablet         Condition on Discharge:  Discharge condition: Stable       Code status on discharge: DNR / DNI     History of Illness:  See detailed admission note for full details.    Physical Exam:  Vital signs:  Temp: 97.7  F (36.5  C) Temp src: Oral BP: 117/70 Pulse: 69   Resp: 16 SpO2: 95 % O2 Device: None (Room air)   Height: 147.3 cm (4' 10\") Weight: 53.2 kg (117 lb 3.2 oz)  Estimated body mass index is 24.49 kg/m  as calculated from the following:    Height as of this encounter: 1.473 m (4' 10\").    Weight as of this encounter: 53.2 kg (117 lb 3.2 oz).    Wt Readings from Last 1 Encounters:   03/25/23 53.2 kg (117 lb 3.2 oz)     General: Alert, awake, no acute distress.  HEENT: NC/AT, eyes anicteric, external occular movements intact, face symmetric.  Dentition WNL, MM moist.  Cardiac: RRR, S1, S2.  No murmurs appreciated.  Pulmonary: Normal chest rise, normal work of breathing.  Lungs CTA BL  Abdomen: soft, non-tender, non-distended.  Bowel Sounds Present.  No guarding.  Extremities: no deformities.  Warm, well perfused.  Skin: no rashes or lesions " noted.  Warm and Dry.  Neuro: No focal deficits noted.  Speech clear.  Coordination and strength grossly normal.  Psych: Appropriate affect.    Procedures other than Imaging:  none     Imaging:  Results for orders placed or performed during the hospital encounter of 03/25/23   CT Chest (PE) Abdomen Pelvis w Contrast    Narrative    EXAM: CT CHEST PE ABDOMEN PELVIS W CONTRAST  LOCATION: [facility]  DATE/TIME: 3/25/2023 4:23 PM    INDICATION: Chest and abdominal pain.  COMPARISON: CT abdomen and pelvis 11/02/2009  TECHNIQUE: CT chest pulmonary angiogram and routine CT abdomen pelvis with IV contrast. Arterial phase through the chest and venous phase through the abdomen and pelvis. Multiplanar reformats and MIP reconstructions were performed. Dose reduction   techniques were used.   CONTRAST: 51mL Isovue 370    FINDINGS:  ANGIOGRAM CHEST: Mildly dilated central pulmonary arteries suggesting pulmonary hypertension. Heterogeneous opacification of several subsegmental branches within the bilateral lower lobes secondary to turbulent flow. No pulmonary emboli are visualized.   Calcified atherosclerotic changes of the thoracic aorta. Per protocol there is no luminal enhancement. No thoracic aortic aneurysm.      LUNGS AND PLEURA: Small bilateral pleural effusions. The right pleural effusion appears loculated. Mild bilateral apical fibrotic changes. Mild, smooth interlobular septal thickening within the bilateral lower upper lobes. This is a nonspecific finding   but may be seen with early interstitial pulmonary edema. Approximately 1.7 x 0.8 cm masslike area of peripheral airspace opacity within the anterior aspect of the right middle lobe. This has dendritic extensions which suggested this may represent rounded   atelectasis.    MEDIASTINUM/AXILLAE: Mildly enlarged hilar and right paratracheal lymph nodes. Large hiatal hernia protrusion of the majority of the stomach into the mediastinum. Marked  cardiomegaly.    CORONARY ARTERY CALCIFICATION: Moderate.    HEPATOBILIARY: 2.6 cm lobulated benign cyst arising from the superior aspect of the left hepatic lobe. 2.3 cm gallstone.    PANCREAS: Normal.    SPLEEN: Normal.    ADRENAL GLANDS: Stable 2.9 cm right adrenal nodule representing a benign adenoma. Negative left adrenal.    KIDNEYS/BLADDER: Multiple benign-appearing bilateral renal cysts. The dominant cyst, which arising from the lateral aspect of the right kidney measures 5.5 x 5.5 cm in diameter. These have a benign appearance and no specific follow-up is necessary. No   hydronephrosis.    BOWEL: Normal caliber loops of small bowel. Duodenal diverticulum. Normal caliber colon. Diverticulosis of the descending and sigmoid portions of the colon. No CT evidence for diverticulitis.    LYMPH NODES: Normal.    VASCULATURE: Moderate calcified atherosclerotic changes. No abdominal aortic aneurysm.    PELVIC ORGANS: Coarse calcifications within a mildly prominent uterus likely within uterine fibroids.    MUSCULOSKELETAL: Chronic fractures of the posterior and posterolateral access of the right ninth and 10th ribs. Degenerative changes visualized spine with increased thoracic kyphosis. Degenerative changes bilateral hips. Partially visualized   postoperative changes of a right femoral intramedullary nail with dynamic hip screw fixation. Degenerative changes bilateral shoulders.      Impression    IMPRESSION:  1.  No pulmonary embolism. Dilated central pulmonary arteries suggesting pulmonary hypertension.  2.  Small bilateral pleural effusions. Right pleural effusion appears loculated.  3.  1.7 cm masslike area of airspace changes within the right middle lobe. This has an appearance suggestive of rounded atelectasis. However recommend follow-up chest CT to ascertain resolution.  4.   Nonspecific mild mediastinal and right hilar adenopathy.  5.  Large hiatal hernia.  6.  Cholelithiasis   MR Thoracic Spine w/o & w  Contrast    Narrative    EXAM: MR THORACIC SPINE W/O and W CONTRAST, MR LUMBAR SPINE W/O and W CONTRAST  LOCATION: [facility]  DATE/TIME: 3/25/2023 9:28 PM    INDICATION: Worsening back pain with urinary incontinence  COMPARISON:  CT abdomen pelvis 11/02/2009, CT chest 03/25/2023.  CONTRAST: 5 mL Gadavist  TECHNIQUE:   1) Routine Thoracic Spine MRI without and with IV contrast.  2) Routine Lumbar Spine MRI without and with IV contrast.    FINDINGS:  THORACIC SPINE:  Thoracolumbar scoliosis. Multilevel degenerative listheses in the thoracic spine with mid/lower thoracic kyphosis. Slight endplate irregularities, height loss (less than 50%), enhancement, and prevertebral edema at T9 suggestive of a recent fracture. Few   levels of degenerative endplate edema also demonstrates sclerotic changes on recent CT. Chronic T2 and T3 superior endplate deformities with mild height loss. Remaining vertebral body heights are unremarkable. Multilevel degenerative changes without   high-grade canal stenosis. Small pleural effusions. The large hiatal hernia is better assessed on recent CT.    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 Incompletely formed disc space at L5-S1. Mildly heterogeneous marrow signal. Lumbar levocurvature. Few levels of degenerative listhesis. No suspicious marrow signal. Multilevel degenerative   changes without high-grade canal stenosis. Severe right L1-L2 and bilateral L2-L3 foraminal stenosis. Additional areas of mild foraminal stenosis. The conus terminates at L2-L3. Dorsal paraspinal and bilateral iliacus muscle atrophy. Intra-abdominal   findings are better assessed on recent CT.       Impression    IMPRESSION:    THORACIC SPINE MRI:  1.  Recent T9 compression fracture with less than 50% vertebral body height loss. Associated enhancement and prevertebral edema. No traumatic subluxation.  2.  Multilevel degenerative changes without high-grade canal  stenosis or abnormal cord signal.    LUMBAR SPINE MRI:  1.  Transitional lumbosacral anatomy with sacralization of L5.  2.  Multilevel degenerative changes without high-grade canal stenosis. Foraminal stenosis, as detailed above.   MR Lumbar Spine w/o & w Contrast    Narrative    EXAM: MR THORACIC SPINE W/O and W CONTRAST, MR LUMBAR SPINE W/O and W CONTRAST  LOCATION: [facility]  DATE/TIME: 3/25/2023 9:28 PM    INDICATION: Worsening back pain with urinary incontinence  COMPARISON:  CT abdomen pelvis 11/02/2009, CT chest 03/25/2023.  CONTRAST: 5 mL Gadavist  TECHNIQUE:   1) Routine Thoracic Spine MRI without and with IV contrast.  2) Routine Lumbar Spine MRI without and with IV contrast.    FINDINGS:  THORACIC SPINE:  Thoracolumbar scoliosis. Multilevel degenerative listheses in the thoracic spine with mid/lower thoracic kyphosis. Slight endplate irregularities, height loss (less than 50%), enhancement, and prevertebral edema at T9 suggestive of a recent fracture. Few   levels of degenerative endplate edema also demonstrates sclerotic changes on recent CT. Chronic T2 and T3 superior endplate deformities with mild height loss. Remaining vertebral body heights are unremarkable. Multilevel degenerative changes without   high-grade canal stenosis. Small pleural effusions. The large hiatal hernia is better assessed on recent CT.    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 Incompletely formed disc space at L5-S1. Mildly heterogeneous marrow signal. Lumbar levocurvature. Few levels of degenerative listhesis. No suspicious marrow signal. Multilevel degenerative   changes without high-grade canal stenosis. Severe right L1-L2 and bilateral L2-L3 foraminal stenosis. Additional areas of mild foraminal stenosis. The conus terminates at L2-L3. Dorsal paraspinal and bilateral iliacus muscle atrophy. Intra-abdominal   findings are better assessed on recent CT.       Impression     IMPRESSION:    THORACIC SPINE MRI:  1.  Recent T9 compression fracture with less than 50% vertebral body height loss. Associated enhancement and prevertebral edema. No traumatic subluxation.  2.  Multilevel degenerative changes without high-grade canal stenosis or abnormal cord signal.    LUMBAR SPINE MRI:  1.  Transitional lumbosacral anatomy with sacralization of L5.  2.  Multilevel degenerative changes without high-grade canal stenosis. Foraminal stenosis, as detailed above.   US Abdomen Limited    Narrative    US ABDOMEN LIMITED   3/27/2023 9:20 AM     HISTORY:  Right upper quadrant pain and gallbladder stones on CT, as  above.    COMPARISON: None available.    FINDINGS:    Gallbladder: Hypoechoic shadowing area in the gallbladder, could  represent gallstone versus tumefactive sludge. No gallbladder wall  thickening or pericholecystic fluid. Sonographic Cabrera's sign is  negative.    Bile ducts:  CHD is normal diameter.  No intrahepatic biliary  dilatation. The distal aspect of the common bile duct is obscured by  overlying bowel gas.    Liver: It demonstrates normal parenchymal echogenicity. There is  approximately 2.3 x 2.6 x 1.4 cm mildly complex cystic area in the  left hepatic lobe.    Pancreas:  Partially obscured by overlying bowel gas,  but grossly  unremarkable.     Right kidney:  No hydronephrosis or shadowing calculi. Multiple  anechoic cysts, the largest 2 measures 5.5 x 4.5 x 5.5 cm at the  interpolar region and 7.8 x 6.1 x 5.5 cm at the lower pole.    Aorta and IVC:  Not specifically assessed.       Impression    IMPRESSION:    1. Hypoechoic shadowing area in the gallbladder, could represent  gallstone versus tumefactive sludge. No sonographic evidence for acute  cholecystitis.  2. 2.6 cm mildly complex left hepatic cyst.  3. Multiple right renal cysts measure up to 7.8 cm at the lower pole.    KRISTYN REYES MD         SYSTEM ID:  [id number]   XR Chest 2 Views    Narrative    XR CHEST 2 VIEWS  3/28/2023 3:36 PM    HISTORY: Chest pain / Rib pain    COMPARISON: 3/25/2023      Impression    IMPRESSION: Stable cardiomegaly and small bilateral pleural effusions.  Mild bibasilar atelectasis is stable. No pneumothorax. Stable large  hiatal hernia.    SANGEETA CAR MD         SYSTEM ID:  [id number]        Consultations:  Consultation during this admission received from urology.       Recent Lab Results:  Recent Labs   Lab 03/27/23  0527 03/26/23  0525 03/25/23  1434   WBC 7.6 6.7 6.2   HGB 12.7 12.2 12.8   HCT 38.0 37.0 38.8   MCV 99 100 100    162 174          Lab Results   Component Value Date     03/27/2023     03/26/2023     03/25/2023    Lab Results   Component Value Date    CHLORIDE 97 03/27/2023    CHLORIDE 102 03/26/2023    CHLORIDE 99 03/25/2023    Lab Results   Component Value Date    BUN 12.4 03/27/2023    BUN 9.1 03/26/2023    BUN 8.7 03/25/2023      Lab Results   Component Value Date    POTASSIUM 3.4 03/27/2023    POTASSIUM 3.4 03/26/2023    POTASSIUM 3.9 03/25/2023    Lab Results   Component Value Date    CO2 28 03/27/2023    CO2 29 03/26/2023    CO2 28 03/25/2023    Lab Results   Component Value Date    CR 0.64 03/27/2023    CR 0.58 03/26/2023    CR 0.57 03/25/2023             Pending Results:    Unresulted Labs Ordered in the Past 30 Days of this Admission     No orders found from 2/23/2023 to 3/26/2023.           Discharge Instructions and Follow-Up:   Discharge Procedure Orders   Home Care Referral   Referral Priority: Routine: Next available opening Referral Type: Home Health Therapies & Aides   Number of Visits Requested: 1     Reason for your hospital stay   Order Comments: You were admitted for compression fracture     Follow-up and recommended labs and tests    Order Comments: Follow up with primary care provider, Nya Sands, within 14 days for hospital follow- up.  Please arrange for repeat CT scan of your chest in 4 to 6 weeks to follow-up on the small fluid  collection and a small part of your lung that does not seem to be fully inflated based on the CT scan here.  This is called rounded atelectasis.     Activity   Order Comments: Your activity upon discharge: activity as tolerated     Order Specific Question Answer Comments   Is discharge order? Yes      Diet   Order Comments: Follow this diet upon discharge: Orders Placed This Encounter      Regular Diet Adult     Order Specific Question Answer Comments   Is discharge order? Yes        Total time spent in face to face contact with the patient and coordinating discharge was:  45 Minutes.

## 2023-03-29 NOTE — PLAN OF CARE
PRIMARY DIAGNOSIS: ACUTE PAIN  OUTPATIENT/OBSERVATION GOALS TO BE MET BEFORE DISCHARGE:  1. Pain Status: Improved-controlled with oral pain medications.    2. Return to near baseline physical activity: Yes    3. Cleared for discharge by consultants (if involved): Yes    Discharge Planner Nurse   Safe discharge environment identified: Yes  Barriers to discharge: No       Entered by: Faustina Wright RN 03/29/2023   A&O x4. VSS, RA. Up w/ SBA gbw. 4/10 pain managed w/ PRN tramadol. Pt sleeping between cares and is able to make needs known. Potential discharge back home today once cleared.     Please review provider order for any additional goals.   Nurse to notify provider when observation goals have been met and patient is ready for discharge.

## 2023-03-30 ENCOUNTER — PATIENT OUTREACH (OUTPATIENT)
Dept: CARE COORDINATION | Facility: CLINIC | Age: 88
End: 2023-03-30
Payer: COMMERCIAL

## 2023-03-30 NOTE — PROGRESS NOTES
"Clinic Care Coordination Contact  [facility]: Post-Discharge Note  SITUATION                                                      Admission:    Admission Date: 03/25/23   Reason for Admission: back pain  Discharge:   Discharge Date: 03/29/23  Discharge Diagnosis: Thoracic compression fx-recent    BACKGROUND                                                      Per hospital discharge summary and inpatient provider notes:Jackie Gauthier is a 89 year old female with a history of htn, large hiatal hernia on PPI, chronic episodic epigastric pain, chronic low back pain admitted on 3/25/2023 with mid thoracic back pain with radiation around to the front, very tender ruq pain of unclear etiology and duration, and 2 episodes of spontaneous urinary incontinence     On the night prior to presentation while watching tv she noticed that she was having some discomfort in her back. Would radiate up her whole back.  No sob/cp but does report intermittent pain in the solar plexus which resolves when she massages it.  She went to bed and her pain was even worse the next morning. Worsens with movement, some radiation around her belly chest area.  She denies any antecedent falls, twisting injuries.       Then she had 2 episodes where she just lost control of her bladder.  One time was in bed, she didn't even know she was peeing.  Then had another episode when walking when she just started to pee on the floor.      ASSESSMENT           Discharge Assessment  How are you doing now that you are home?: \" I am doing good and slept well \"  How are your symptoms? (Red Flag symptoms escalate to triage hotline per guidelines): Improved  Do you feel your condition is stable enough to be safe at home until your provider visit?: Yes  Does the patient have their discharge instructions? : Yes  Does the patient have questions regarding their discharge instructions? : No  Were you started on any new medications or were there changes to any of " your previous medications? : Yes  Does the patient have all of their medications?: Yes  Do you have questions regarding any of your medications? : No  Do you have all of your needed medical supplies or equipment (DME)?  (i.e. oxygen tank, CPAP, cane, etc.): Yes  Discharge follow-up appointment scheduled within 14 calendar days? : Yes  Discharge Follow Up Appointment Date: 04/03/23  Discharge Follow Up Appointment Scheduled with?: Primary Care Provider    Post-op ([facility] Only)  If the patient had a surgery or procedure, do they have any questions for a nurse?: No             PLAN                                                      Outpatient Plan: Follow up with primary care provider, Nya Sands, within 14 days for hospital follow- up.  Please arrange for repeat CT scan of your chest in 4 to 6 weeks to follow-up on the small fluid collection and a small part of your lung that does not seem to be fully inflated based on the CT scan here.  This is called rounded atelectasis.    No future appointments.      For any urgent concerns, please contact our 24 hour nurse triage line: [phone] ([phone])         Rene Carpio